# Patient Record
Sex: FEMALE | Race: WHITE | NOT HISPANIC OR LATINO | Employment: UNEMPLOYED | URBAN - NONMETROPOLITAN AREA
[De-identification: names, ages, dates, MRNs, and addresses within clinical notes are randomized per-mention and may not be internally consistent; named-entity substitution may affect disease eponyms.]

---
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
## 2024-01-07 ENCOUNTER — HOSPITAL ENCOUNTER (EMERGENCY)
Facility: OTHER | Age: 19
Discharge: PSYCHIATRIC OR SUBSTANCE USE FACILITY | End: 2024-01-08
Attending: STUDENT IN AN ORGANIZED HEALTH CARE EDUCATION/TRAINING PROGRAM | Admitting: STUDENT IN AN ORGANIZED HEALTH CARE EDUCATION/TRAINING PROGRAM
Payer: MEDICAID

## 2024-01-07 DIAGNOSIS — Z72.89 DELIBERATE SELF-CUTTING: ICD-10-CM

## 2024-01-07 DIAGNOSIS — R45.851 SUICIDAL IDEATION: ICD-10-CM

## 2024-01-07 PROCEDURE — 250N000013 HC RX MED GY IP 250 OP 250 PS 637: Performed by: STUDENT IN AN ORGANIZED HEALTH CARE EDUCATION/TRAINING PROGRAM

## 2024-01-07 PROCEDURE — 99285 EMERGENCY DEPT VISIT HI MDM: CPT | Performed by: STUDENT IN AN ORGANIZED HEALTH CARE EDUCATION/TRAINING PROGRAM

## 2024-01-07 PROCEDURE — 99291 CRITICAL CARE FIRST HOUR: CPT | Mod: 25 | Performed by: STUDENT IN AN ORGANIZED HEALTH CARE EDUCATION/TRAINING PROGRAM

## 2024-01-07 PROCEDURE — 99292 CRITICAL CARE ADDL 30 MIN: CPT | Performed by: STUDENT IN AN ORGANIZED HEALTH CARE EDUCATION/TRAINING PROGRAM

## 2024-01-07 RX ADMIN — NICOTINE POLACRILEX 2 MG: 2 GUM, CHEWING BUCCAL at 16:24

## 2024-01-07 ASSESSMENT — ACTIVITIES OF DAILY LIVING (ADL)
ADLS_ACUITY_SCORE: 35

## 2024-01-07 NOTE — ED NOTES
[facility] called to state that they were bringing this pt in for depression and suicidal ideation, pt is coming from the [facility]
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
## 2024-01-07 NOTE — ED NOTES
PT mentioned they are feeling really agitated and needs a smoke. Mentioned to RN. RN is administrating Nicotine gum to help with agitation.

## 2024-01-07 NOTE — ED NOTES
PT is in a really good mood. Had a really assuring phone call from her boyfriend and helped her mood tremendously.

## 2024-01-07 NOTE — ED NOTES
RN talked to me and just wanted me to keep an eye on PT. Apparently they don't need a 1 on 1 sitter yet. Only necessary if their RN decides it's necessary

## 2024-01-07 NOTE — ED NOTES
Nurse removed bands from R wrist to the L. PT is calm and cooperative and coloring to keep calm and listening to music on phone

## 2024-01-08 ENCOUNTER — TELEPHONE (OUTPATIENT)
Dept: BEHAVIORAL HEALTH | Facility: CLINIC | Age: 19
End: 2024-01-08
Payer: MEDICAID

## 2024-01-08 ENCOUNTER — HOSPITAL ENCOUNTER (INPATIENT)
Facility: HOSPITAL | Age: 19
LOS: 4 days | Discharge: NURSING, REHAB OR LONG-TERM CARE | End: 2024-01-12
Attending: STUDENT IN AN ORGANIZED HEALTH CARE EDUCATION/TRAINING PROGRAM | Admitting: STUDENT IN AN ORGANIZED HEALTH CARE EDUCATION/TRAINING PROGRAM
Payer: MEDICAID

## 2024-01-08 VITALS
DIASTOLIC BLOOD PRESSURE: 78 MMHG | SYSTOLIC BLOOD PRESSURE: 134 MMHG | HEIGHT: 65 IN | OXYGEN SATURATION: 99 % | TEMPERATURE: 97.4 F | HEART RATE: 88 BPM | WEIGHT: 293 LBS | BODY MASS INDEX: 48.82 KG/M2 | RESPIRATION RATE: 16 BRPM

## 2024-01-08 DIAGNOSIS — F33.1 MAJOR DEPRESSIVE DISORDER, RECURRENT EPISODE, MODERATE (H): Primary | ICD-10-CM

## 2024-01-08 PROBLEM — R45.89 SUICIDAL BEHAVIOR: Status: ACTIVE | Noted: 2024-01-08

## 2024-01-08 LAB
AMPHETAMINES UR QL SCN: ABNORMAL
BARBITURATES UR QL SCN: ABNORMAL
BENZODIAZ UR QL SCN: ABNORMAL
BZE UR QL SCN: ABNORMAL
CANNABINOIDS UR QL SCN: ABNORMAL
FENTANYL UR QL: ABNORMAL
HCG UR QL: NEGATIVE
OPIATES UR QL SCN: ABNORMAL
PCP QUAL URINE (ROCHE): ABNORMAL

## 2024-01-08 PROCEDURE — 250N000013 HC RX MED GY IP 250 OP 250 PS 637: Performed by: STUDENT IN AN ORGANIZED HEALTH CARE EDUCATION/TRAINING PROGRAM

## 2024-01-08 PROCEDURE — 250N000013 HC RX MED GY IP 250 OP 250 PS 637: Performed by: NURSE PRACTITIONER

## 2024-01-08 PROCEDURE — 124N000001 HC R&B MH

## 2024-01-08 PROCEDURE — 81025 URINE PREGNANCY TEST: CPT | Performed by: STUDENT IN AN ORGANIZED HEALTH CARE EDUCATION/TRAINING PROGRAM

## 2024-01-08 PROCEDURE — 80307 DRUG TEST PRSMV CHEM ANLYZR: CPT | Performed by: STUDENT IN AN ORGANIZED HEALTH CARE EDUCATION/TRAINING PROGRAM

## 2024-01-08 RX ORDER — NICOTINE 21 MG/24HR
1 PATCH, TRANSDERMAL 24 HOURS TRANSDERMAL DAILY
Status: DISCONTINUED | OUTPATIENT
Start: 2024-01-08 | End: 2024-01-12 | Stop reason: HOSPADM

## 2024-01-08 RX ORDER — LANOLIN ALCOHOL/MO/W.PET/CERES
3 CREAM (GRAM) TOPICAL
Status: DISCONTINUED | OUTPATIENT
Start: 2024-01-08 | End: 2024-01-12 | Stop reason: HOSPADM

## 2024-01-08 RX ORDER — HYDROXYZINE HYDROCHLORIDE 25 MG/1
25 TABLET, FILM COATED ORAL EVERY 4 HOURS PRN
Status: DISCONTINUED | OUTPATIENT
Start: 2024-01-08 | End: 2024-01-08 | Stop reason: HOSPADM

## 2024-01-08 RX ORDER — OLANZAPINE 10 MG/1
10 TABLET ORAL 3 TIMES DAILY PRN
Status: DISCONTINUED | OUTPATIENT
Start: 2024-01-08 | End: 2024-01-09

## 2024-01-08 RX ORDER — ACETAMINOPHEN 325 MG/1
650 TABLET ORAL EVERY 4 HOURS PRN
Status: DISCONTINUED | OUTPATIENT
Start: 2024-01-08 | End: 2024-01-12 | Stop reason: HOSPADM

## 2024-01-08 RX ORDER — DIPHENHYDRAMINE HCL 25 MG
50 CAPSULE ORAL ONCE
Status: COMPLETED | OUTPATIENT
Start: 2024-01-08 | End: 2024-01-08

## 2024-01-08 RX ORDER — ACETAMINOPHEN 325 MG/1
650 TABLET ORAL EVERY 4 HOURS PRN
Status: DISCONTINUED | OUTPATIENT
Start: 2024-01-08 | End: 2024-01-08 | Stop reason: HOSPADM

## 2024-01-08 RX ORDER — AMOXICILLIN 250 MG
1 CAPSULE ORAL 2 TIMES DAILY PRN
Status: DISCONTINUED | OUTPATIENT
Start: 2024-01-08 | End: 2024-01-12 | Stop reason: HOSPADM

## 2024-01-08 RX ORDER — MAGNESIUM HYDROXIDE/ALUMINUM HYDROXICE/SIMETHICONE 120; 1200; 1200 MG/30ML; MG/30ML; MG/30ML
30 SUSPENSION ORAL EVERY 4 HOURS PRN
Status: DISCONTINUED | OUTPATIENT
Start: 2024-01-08 | End: 2024-01-12 | Stop reason: HOSPADM

## 2024-01-08 RX ORDER — OLANZAPINE 10 MG/2ML
10 INJECTION, POWDER, FOR SOLUTION INTRAMUSCULAR 3 TIMES DAILY PRN
Status: DISCONTINUED | OUTPATIENT
Start: 2024-01-08 | End: 2024-01-09

## 2024-01-08 RX ORDER — HYDROXYZINE HYDROCHLORIDE 25 MG/1
25 TABLET, FILM COATED ORAL EVERY 4 HOURS PRN
Status: DISCONTINUED | OUTPATIENT
Start: 2024-01-08 | End: 2024-01-12 | Stop reason: HOSPADM

## 2024-01-08 RX ADMIN — DIPHENHYDRAMINE HYDROCHLORIDE 50 MG: 25 CAPSULE ORAL at 02:39

## 2024-01-08 RX ADMIN — NICOTINE 1 PATCH: 21 PATCH, EXTENDED RELEASE TRANSDERMAL at 19:44

## 2024-01-08 ASSESSMENT — ACTIVITIES OF DAILY LIVING (ADL)
ADLS_ACUITY_SCORE: 35
ADLS_ACUITY_SCORE: 28
ADLS_ACUITY_SCORE: 45
ADLS_ACUITY_SCORE: 35
ADLS_ACUITY_SCORE: 45
ADLS_ACUITY_SCORE: 35
ADLS_ACUITY_SCORE: 35
ADLS_ACUITY_SCORE: 28
ADLS_ACUITY_SCORE: 35
ADLS_ACUITY_SCORE: 35
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
## 2024-01-08 NOTE — TELEPHONE ENCOUNTER
R:  HI/Mike Pike with [facility] is reviewing pt for [facility] @ 1204 PM.   1:32 PM Shhazad at [facility] called and accepted pt for [facility] under MD [facility]. Bed is available, call anytime for report.     Updated worklist and added to admit board.  Did tranfers center, bed planning, and placed pt in queue.     2:02 PM [facility] called with placement info.  Indicia complete

## 2024-01-08 NOTE — ED NOTES
No safety plan in notes or AVS. Contacted [facility], spoke with Po.  who saw patient at 1825 today will contact ER.

## 2024-01-08 NOTE — PLAN OF CARE
Mansoor Skinner  January 8, 2024  Plan of Care Hand-off Note     Patient Care Path: inpatient mental health    Plan for Care:   It is the recommendation of this clinician that pt admit to IP MH for safety and stabilization. Pt displays the following risk factors that support IP admission: Pt presents for suicide ideation with no specific plan. Pt reports she doesn't have housing, no insurance, and lacks supports in the state. Pt reports she is in need of help and has no skills to support herself. Pt reports she was kicked out of the homeless shelter she was staying due to drinking on property. Pt father reports pt has been having increasing mental health issues and needs help navigating the system. Pt is unable to engage in safety planning to mitigate risk level in a non-secure setting. Lower levels of care are not sufficient. Due to this IP is the least restrictive option of care for pt. Pt should remain in IP until deemed safe to return to the community and engage in OP  supports. Pt would benefit from social work consult and CIERRA assessment coordination.    Identified Goals and Safety Issues: Pt has no insurance, no local supports, no housing, and lacks skills to support herself. Reports ideas to kill herself but does not have plan. Unable to safety plan.    Overview:  Amandeep Skinner (Father) -- -- [phone]            Legal Status: Legal Status at Admission: Voluntary/Patient has signed consent for treatment    Psychiatry Consult: no       Updated   regarding plan of care.           Negar Arce, [facility], LADC

## 2024-01-08 NOTE — PROGRESS NOTES
"Triage & Transition Services, Extended Care     Therapy Progress Note    Patient: Mansoor goes by \"Mansoor,\" uses she/her pronouns  Date of Service: January 8, 2024  Site of Service: [facility]                               Patient was seen yes  Mode of Assessment: Virtual: AmWell    Presentation Summary: Writer and patient met virtually.  Patient is somewhat engaged in the session however does not appear to be an entirely accurate historian.  Patient reports that she recently moved to [place] from [place] with her father in late November or early December 2023.  She states since moving they were kicked out of the friend's home who they were originally staying with, and have also both been kicked out of [facility] due to alcohol use.  Patient reports that she has limited supports or coping skills that she can engage in.  Patient reports that she has been having increased depression since she was 15 since she has been dealing with homelessness.  Patient states that she has recently began cutting again stating this is the only thing that she can do to help distract herself from suicidal thoughts.  Additionally she endorses her sister's 7th birthday January 5, as an increasingly stressful day she reports that she helped raise both her younger brother and sister, but states her mother got off her contact with them stating her mother told her \"will never see them again\".  Patient did become tearful when talking about this.  Patient denies any supports in [place], but does state she was working with therapy in [place].  At this time patient struggles with goal oriented thinking, and appears to be stuck in negative thought process.  Throughout the session patient gives writer 3 different accounts of how much alcohol she has been drinking recently, 1 count stating \"about a glass at a time\" another account she states she drinks \"3 cups of vodka\" with her final count stating \"till I blackout\".  " Patient does not state how often she drinks. She does endorse an increase in SIB over the past few weeks.    Therapeutic Intervention(s) Provided: Engaged in cognitive restructuring/ reframing, looked at common cognitive distortions and challenged negative thoughts.    Current Symptoms: anxious sense of doom, apathy, negativistic, hoplessness, helplessness, thoughts of death/suicide          Mental Status Exam   Affect: Blunted  Appearance: Appropriate  Attention Span/Concentration: Attentive  Eye Contact: Engaged    Fund of Knowledge: Appropriate   Language /Speech Content: Fluent  Language /Speech Volume: Normal  Language /Speech Rate/Productions: Normal  Recent Memory: Intact  Remote Memory: Intact  Mood: Sad, Depressed  Orientation to Person: Yes   Orientation to Place: Yes  Orientation to Time of Day: Yes  Orientation to Date: Yes     Situation (Do they understand why they are here?): Yes  Psychomotor Behavior: Normal  Thought Content: Suicidal  Thought Form: Intact    Treatment Objective(s) Addressed: rapport building, orienting the patient to therapy, identifying and practicing coping strategies, assessing safety, processing feelings    Patient Response to Interventions: acceptance expressed, needs reinforcement    Progress Towards Goals: Patient Reports Symptoms Are: ongoing  Patient Progress Toward Goals: other (While patient is somewhat engaged it is difficult to discern what information is accurate as patient gives several accounts of recent events.)  Next Step to Work Toward Discharge: symptom stabilization  Symptom Stabilization Comment: Patient would benefit from medication management and increased coping skills to help deal with external stressors.    Case Management:      Plan: inpatient mental health  yes provider Dr. Wade  yes    Clinical Substantiation: Writer continues to recommend inpatient mental health placement due to ongoing suicidal ideation and lack of supports and resources.   Additionally patient has been using increased substances to help cope with her depression and suicidal thoughts. She has engaged in increased SIB over the past few weeks.    Legal Status: Legal Status at Admission: Voluntary/Patient has signed consent for treatment    Session Status: Time session started: 1336  Time session ended: 1356  Session Duration (minutes): 20 minutes  Session Number: 1  Anticipated number of sessions or this episode of care: 3    Time Spent: 20 minutes    CPT Code: CPT Codes: 80607 - Psychotherapy (with patient) - 30 (16-37*) min    Diagnosis:   Patient Active Problem List   Diagnosis Code    Major depressive disorder, recurrent episode, moderate (H) F33.1       Primary Problem This Admission: Active Hospital Problems    *Major depressive disorder, recurrent episode, moderate (H)        Sami Camargo   Licensed Mental Health Professional (LMHP), [facility]  [phone]

## 2024-01-08 NOTE — ED NOTES
Pt is cooperative this AM, breakfast was ordered, VSS, pt reports minimal thoughts of fleeting suicide in the past, but no active threats while here

## 2024-01-08 NOTE — ED PROVIDER NOTES
[facility]  Emergency Department Sign Out Note      Transfer of care from Dr. Jovel. See separate Emergency Department note.    Assessment and Plan:  The patient was evaluated by the previous provider for placement to another cirsis bed vs admit to IP.     ED Course as of 01/08/24 6599   Sun Jan 07, 2024   1933 Not able to contract for safety. DEC recommends either observation overnight with SW consult in morning or IP. Ultimately we decided to pursue both of these options simultaneously placing on IP list and have SW consult tomorrow to explore alternative living options/mental health resources with patient not having insurance.   Mon Jan 08, 2024   1357 DEC Patient has been accepted to [facility]       Diagnosis  1. Suicidal ideation    2. Deliberate self-cutting        Disposition:  Transfer to Madhuri Thornton MD  01/08/24 3217

## 2024-01-08 NOTE — ED PROVIDER NOTES
Transfer of care from previous shift provider:. SI from [facility]. Pending DEC eval. No issues overnight. DEC plan per below. Recommend following up with SW after their consult to see if their are community housing resources available after being released from [place] homeless shelter. Signed out to day shift provider Dr Wade to follow up with SW consult.    ED Course as of 01/08/24 0626   Sun Jan 07, 2024   1933 Not able to contract for safety. DEC recommends either observation overnight with SW consult in morning or IP. Ultimately we decided to pursue both of these options simultaneously placing on IP list and have SW consult tomorrow to explore alternative living options/mental health resources with patient not having insurance.     Assessment and Plan:  Final diagnoses:   Suicidal ideation   Deliberate self-cutting         Bradley Jovel MD  01/08/24 0639

## 2024-01-08 NOTE — PROGRESS NOTES
:    Patient presented to the ED with CRT for depression and suicidal ideations.  Patient has been staying at the [facility] (homeless shelter in [place]).  DEC assessed and recommended inpatient mental health placement.  Patient is voluntary and was placed on the inpatient waiting list.    Met with patient in room and she stated about a month ago her and her father drove to [place] from [place] to stay with a friend.  It did not work out at the friends house and were told them to leave, so they both went to the [facility].  Patient stated her father left a weeks ago back to [place], but she decided to stay as she has a boyfriend here.  Patient requested to have her boyfriend be added onto her contacts. Patient stated her boyfriend is staying at a residential  treatment facility.    Gave her information on housing resources and resources in the community as patient stated she would like to remain in the area after her inpatient stay.    RAMY Jackson on 1/8/2024 at 9:24 AM         :    Patient was accepted to [facility] for inpatient mental health.  No further needs at this time.    RAMY Jackson on 1/8/2024 at 2:03 PM

## 2024-01-08 NOTE — ED NOTES
IP MH Referral Acuity Rating Score (RARS)    LMHP complete at referral to IP MH, with DEC; and, daily while awaiting IP MH placement. Call score to PPS.  CRITERIA SCORING   New 71 HH and Involuntary for IP MH (not adolescent) 0/1   Boarding over 24 hours 0/1   Vulnerable adult at least 55+ with multiple co morbidities; or, Patient age 11 or under 0/1   Suicide ideation without relief of precipitating factors 1/1   Current plan for suicide 0/1   Current plan for homicide 0/1   Imminent risk or actual attempt to seriously harm another without relief of factors precipitating the attempt 1/1   Severe dysfunction in daily living (ex: complete neglect for self care, extreme disruption in vegetative function, extreme deterioration in social interactions) 1/1   Recent (last 2 weeks) or current physical aggression in the ED 0/1   Restraints or seclusion episode in ED 0/1   Verbal aggression, agitation, yelling, etc., while in the ED 1/1   Active psychosis with psychomotor agitation or catatonia 0/1   Need for constant or near constant redirection (from leaving, from others, etc).  0/1   Intrusive or disruptive behaviors 1/1   TOTAL Acuity Total Score: 5

## 2024-01-09 LAB
CLUE CELLS: PRESENT
TRICHOMONAS, WET PREP: ABNORMAL
WBC'S/HIGH POWER FIELD, WET PREP: ABNORMAL
YEAST, WET PREP: ABNORMAL

## 2024-01-09 PROCEDURE — 99223 1ST HOSP IP/OBS HIGH 75: CPT | Mod: AI | Performed by: NURSE PRACTITIONER

## 2024-01-09 PROCEDURE — 87210 SMEAR WET MOUNT SALINE/INK: CPT | Performed by: NURSE PRACTITIONER

## 2024-01-09 PROCEDURE — 99222 1ST HOSP IP/OBS MODERATE 55: CPT | Performed by: NURSE PRACTITIONER

## 2024-01-09 PROCEDURE — 250N000013 HC RX MED GY IP 250 OP 250 PS 637: Performed by: NURSE PRACTITIONER

## 2024-01-09 PROCEDURE — 124N000001 HC R&B MH

## 2024-01-09 RX ORDER — ALBUTEROL SULFATE 90 UG/1
2 AEROSOL, METERED RESPIRATORY (INHALATION) EVERY 6 HOURS PRN
Status: DISCONTINUED | OUTPATIENT
Start: 2024-01-09 | End: 2024-01-12 | Stop reason: HOSPADM

## 2024-01-09 RX ORDER — METRONIDAZOLE 500 MG/1
500 TABLET ORAL 2 TIMES DAILY
Status: DISCONTINUED | OUTPATIENT
Start: 2024-01-09 | End: 2024-01-12 | Stop reason: HOSPADM

## 2024-01-09 RX ORDER — IBUPROFEN 200 MG
800 TABLET ORAL DAILY PRN
COMMUNITY

## 2024-01-09 RX ORDER — ACETAMINOPHEN 325 MG/1
1300 TABLET ORAL DAILY PRN
Status: ON HOLD | COMMUNITY
End: 2024-01-12

## 2024-01-09 RX ORDER — CHLORPROMAZINE HYDROCHLORIDE 25 MG/1
50 TABLET, FILM COATED ORAL 2 TIMES DAILY
Status: DISCONTINUED | OUTPATIENT
Start: 2024-01-09 | End: 2024-01-12 | Stop reason: HOSPADM

## 2024-01-09 RX ADMIN — METRONIDAZOLE 500 MG: 500 TABLET ORAL at 20:20

## 2024-01-09 RX ADMIN — CHLORPROMAZINE HYDROCHLORIDE 50 MG: 25 TABLET, FILM COATED ORAL at 18:57

## 2024-01-09 RX ADMIN — NICOTINE POLACRILEX 4 MG: 4 GUM, CHEWING BUCCAL at 21:57

## 2024-01-09 RX ADMIN — CHLORPROMAZINE HYDROCHLORIDE 50 MG: 25 TABLET, FILM COATED ORAL at 12:12

## 2024-01-09 RX ADMIN — ACETAMINOPHEN 650 MG: 325 TABLET, FILM COATED ORAL at 18:57

## 2024-01-09 RX ADMIN — NICOTINE POLACRILEX 4 MG: 4 GUM, CHEWING BUCCAL at 13:07

## 2024-01-09 RX ADMIN — NICOTINE 1 PATCH: 21 PATCH, EXTENDED RELEASE TRANSDERMAL at 10:11

## 2024-01-09 ASSESSMENT — ACTIVITIES OF DAILY LIVING (ADL)
HYGIENE/GROOMING: INDEPENDENT
ADLS_ACUITY_SCORE: 28
ORAL_HYGIENE: INDEPENDENT
ADLS_ACUITY_SCORE: 28
ADLS_ACUITY_SCORE: 28
DRESS: SCRUBS (BEHAVIORAL HEALTH)
DRESS: SCRUBS (BEHAVIORAL HEALTH);INDEPENDENT
ADLS_ACUITY_SCORE: 28
HYGIENE/GROOMING: INDEPENDENT
LAUNDRY: UNABLE TO COMPLETE
ADLS_ACUITY_SCORE: 28
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
## 2024-01-09 NOTE — PLAN OF CARE
"ADMISSION NOTE    Reason for admission: SI, kicked out of [facility] for drinking, moved to [place] from [place],  Safety concerns: irritable, unknown back ground poor historian, answers questions with questions, confrontational, BMI of 58, defense mode     Risk for or history of violence: I'm from \"fuck shit up club\" \"I'll fight if I have to.\" \"I punch shit\"  pressured speech, boisterous tone et over annunciating words.     Full skin assessment: Head et neck appear atraumatic, superficial cuts to left anterior forearm, (scratch like); barely visible, no s/s infection. Refuses focused assessment to pannus, groin region, et buttocks, absent edema, h/o surgical scar to left elbow, excessive hair to chest et abd ; presents with s/s of hirsutism- hygiene is fair, nails trimmed.     Patient arrived on unit from [facility] ED accompanied by transport et 2 [place] security officers on 1/8/2024  16:40 PM.   Status on arrival: Confrontational, irritable, dismissive, tense, animated,     /60   Temp 98.4  F (36.9  C) (Tympanic)   Resp 18   LMP 12/03/2023 (Approximate)   Patient unable to complete full tour of unit r/t milieu stimuli. Welcome to  unit papers given to patient, wanding completed, belongings inventoried, and admission assessment in progress.   Patient's legal status on arrival is Voluntary.. Appropriate legal rights discussed with and copy given to patient. Patient Bill of Rights discussed with and copy given to patient.   Patient denies SI, HI, and thoughts of self harm and contracts for safety while on unit.      Argenis Parisi, MONICA  1/8/2024  10:59 PM    Pharmacy  [facility] [place], [place]     Pt unable to recall Rx medications prescribed, states, \"I grew up in mental intstiutations all my life, I graduated in one\"     Reason for hospitalization: \"Those people @ the [facility] pissed me off\" \"I'm suicidal\" \"I cut myself\" \"See???\"     21:30: Pt lying in bed @ this time, displaying s/s of " sleep. Pt willing entered -IC) during limited tour of unit, became fearful, paranoid, easily frightened, jumps up, karate stance with fists out. Pt became tearful once settled in MH-ICU. No prn pharmacological intervention implemented this PM shift.     Face to face end of shift report communicated to on-coming [facility] staff.     Argenis Parisi RN  1/9/2024  12:35 AM    Problem: Adult Behavioral Health Plan of Care  Goal: Patient-Specific Goal (Individualization)  Description: Pt. Will follow recommendations of treatment team during hospital stay.   Pt. Will maintain ADLs without prompting during hospital stay.   Pt. Will attend > 50% of unit programing during hospital stay.   Pt. Will sleep 6-8 hours a night during hospital stay.  Pt. Will be free from self harm during hospital stay.  Pt. Will verbalize 3 coping skills prior to discharge.     Problem: Suicide Risk  Goal: Absence of Self-Harm  Outcome: Progressing   Goal Outcome Evaluation:
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
## 2024-01-09 NOTE — PLAN OF CARE
Problem: Adult Behavioral Health Plan of Care  Goal: Patient-Specific Goal (Individualization)  Description: Pt. Will follow recommendations of treatment team during hospital stay.   Pt. Will maintain ADLs without prompting during hospital stay.   Pt. Will attend > 50% of unit programing during hospital stay.   Pt. Will sleep 6-8 hours a night during hospital stay.  Pt. Will be free from self harm during hospital stay.  Pt. Will verbalize 3 coping skills prior to discharge.   1/08/23; MHICU upon admit r/t verbal aggression, unpredictability et decreased stimuli. No wean @ this time. Treatment team to discuss daily.    Outcome: Progressing     Problem: Psychotic Symptoms  Goal: Psychotic Symptoms  Description: Signs and symptoms of listed problems will be absent or manageable.  Outcome: Progressing     Problem: Suicide Risk  Goal: Absence of Self-Harm  Outcome: Progressing     Face to face shift report received from Ar[facility]. Rounding completed, patient laying in bed in MHICU awake.    Patient appeared to be sleeping for approximately 5 hours since 0015.    Patient had no reported or observed suicidal behavior or self harm this shift.     Face to face report will be communicated to oncoming RN.    Noelle Dodson RN  1/9/2024  6:32 AM

## 2024-01-09 NOTE — H&P
"[facility] PSYCHIATRY   HISTORY AND PHYSICAL     ADMISSION DATA     Mansoor Skinner MRN# [id number]   Age: 18 year old YOB: 2005     Date of Admission: 1/8/2024  Primary Physician: No Ref-Primary, Physician          CHIEF COMPLAINT   \" I really need to stop drinking.\"       HISTORY OF PRESENT ILLNESS     She was brought to the emergency room by crisis response team for suicidal ideation.  She had been staying at the [facility] homeless shelter.    She recently moved to [place] from [place] with her father early December 2023.  She and her father were kicked out of their friends home that they were staying with and they have also both been kicked out of the [facility] due to alcohol use.  She told the emergency room she has been dealing with homelessness since the age of 15 which clearly causes an increase in her depression.  It sounds as though her father return to [place] 1 week ago though she decided to stay here with her boyfriend.  She does not have an active suicidal plan.  Denies any homicidal ideations denies hallucinations.  In the past she had been on Zoloft though she has not been on it for over 1 year she has also been on Thorazine in the past though unclear who prescribed this to her and for what.    I was told that when she arrived to the nursing unit and upon her arrival was quite dramatic and making comments to staff \"I am going to fuck shit up, I fight if I have to.I punch shit.  She was quite irritable upon admission though she is not on a 72-hour hold and is voluntarily here.    I met with her the following morning and upon my meeting with her she had already been woke up twice and made a comment about how \"people keep waking me up\".  She had already told nursing staff she wanted to leave and I told her that I was the person who she would need to speak with if she wanted to leave.  She then started stating \"I need to be getting into housing.  I been homeless for 3 years " "and I cannot do it anymore it makes him more depressed and I need to get back on my Thorazine\".  She is quite animated dramatic and loud.  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  She is quite concrete in her thought process.  Her speech is loud and a bit pressured though it is difficult to tell if this is secondary to her mood versus personality.  She tells me she is having suicidal thoughts and has been all of the time though states \"they only get really bad when I start drinking and I need to stop drinking\".  I asked her if she would like to go to chemical dependency treatment and initially she stated yes the later on told the  she wanted to go to treatment.  She told me that she needed to find an apartment or house of her own from here.  I told her it would be very difficult to find her a place of living from here quickly.  She tells me she has only been here 1 month though previous notes states she has been here 1 year.  She tells me her father went back to [place].  She states she and her father are very close and she misses him though it stayed back in [place] because she met a boy she has been dating for the last 3 weeks.  This man is in some form of residential treatment in [place].  She states \"I need to stick around because he said he is going to be spending $30,000 on me\".  It sounds as though he may be getting a lump sum of money and has told her he is going to spend at all on her.  She states he is a level of her life.  They have been dating 3 weeks.     PSYCHIATRIC HISTORY     She has not had any psychiatric services in [place] though did have a therapist in [place].  She recently started cutting again to distract herself from suicidal thoughts.  She grew up primarily in mental health residential treatment centers.  She states she has been hospitalized multiple times   "     SUBSTANCE USE HISTORY     Alcohol use nearly daily.  Unsure if she has been to treatment in the past though states she does not want treatment now though apparently later on changed her mind       SOCIAL HISTORY   Difficult to assess as she was quite dramatic and really fixated on me finding her housing is soon as possible.  She was born and raised in [place].  She has only been here for 1 month and had been homeless the entire time except for a short period while living with the father's friend.  Her father is now back in [place].  She has no other connections in [place] other than this boyfriend of 3 weeks.  I suspect she is on disability though unsure.       FAMILY HISTORY   Father has issues with alcohol.  On nondiagnostic history of family will try to get more information from her tomorrow.     PAST MEDICAL HISTORY   No past medical history on file.    No past surgical history on file.    Penicillin g     MEDICATIONS   Prior to Admission medications    Medication Sig Start Date End Date Taking? Authorizing Provider   albuterol (PROAIR HFA/PROVENTIL HFA/VENTOLIN HFA) 108 (90 Base) MCG/ACT inhaler Inhale 2 puffs into the lungs every 6 hours as needed for shortness of breath, wheezing or cough 12/5/23   Shanell Hoffman, DO        PHYSICAL EXAM/ROS     I have reviewed the physical exam as documented by Madhuri Rowley CNP  and agree with findings and assessment and have no additional findings to add at this time. The review of systems is negative other than noted in the HPI.       LABS   Recent Results (from the past 24 hour(s))   Urine Drug Screen Panel    Collection Time: 01/08/24 12:35 PM   Result Value Ref Range    Amphetamines Urine Screen Negative Screen Negative    Barbituates Urine Screen Negative Screen Negative    Benzodiazepine Urine Screen Negative Screen Negative    Cannabinoids Urine Screen Positive (A) Screen Negative    Cocaine Urine Screen Negative Screen Negative    Fentanyl Qual Urine  "Screen Negative Screen Negative    Opiates Urine Screen Negative Screen Negative    PCP Urine Screen Negative Screen Negative   HCG qualitative urine    Collection Time: 01/08/24 12:36 PM   Result Value Ref Range    hCG Urine Qualitative Negative Negative         MENTAL STATUS EXAM   Vitals: /68   Pulse 77   Temp 97.7  F (36.5  C) (Tympanic)   Resp 14   LMP 12/03/2023 (Approximate)   SpO2 97%       /53 (BP Location: Right arm)   Pulse 73   Temp 98.2  F (36.8  C) (Tympanic)   Resp 16   LMP 12/03/2023 (Approximate)   SpO2 98%   -Appearance/Behavior:somewhat disheveled  -Motor: Normal  -Gait: Intact none  -Abnormal involuntary movements: None  -Mood: labile  -Affect: expansive  -Speech: pressurred  rambling & loud.  Fixated on leaving.  -Thought process/associations: Somewhat demanding.  Questionable grandiosity versus personality disorder entitlement  -Thought content: States she has hallucinations frequently though when asked what the voices say she states \"I cannot remember\"  -Perceptual disturbance no delusions noted  -Suicidal/Homicidal Ideation:  -Judgment: poor  -Insight: poor  *Orientation: difficult to assess. Doesn't answer all questions.   *Memory: difficult to assess due to edie  *Attention: poor  *Language: fluent, no aphasias, able to repeat phrases and name objects if edie was resolved  *Fund of information: likely average though difficult to assess due to edie  *Cognitive functioning estimate: 0 - independent when stable, currently limited abilify to care for self        ASSESSMENT     This is a 18 year old female with a PMH of multiple hospitalizations from childhood sounds as though she was raised in some residential setting throughout her childhood and was on multiple psychiatric medications and states the only medication that has been significantly effective is Thorazine.  She was having an increase in suicidal thoughts and states it was related to alcohol consumption and " "that \"I need to quit drinking\".  She is no longer having suicidal thoughts though states she is having hallucinations as up until yesterday though \"cannot remember\" what they are saying\".       DIAGNOSIS     1.  Unspecified mood disorder  2.  Unspecified cognitive disorder: Rule out intellectual disability versus borderline intellectual functioning  3.  Alcohol use disorder, moderate to severe       PLAN     Location: Unit   Legal Status: None    Safety Assessment:    Behavioral Orders   Procedures    Code 1 - Restrict to Unit    Routine Programming     As clinically indicated    Status 15     Every 15 minutes.      PTA medications held:     -n/a    PTA medications continued/changed:     -n/a    New medications initiated:     -Restart Thorazine 50 mg twice a day and will increase to 100 twice a day if needed.  She has been on this in the past with no side effects    Programming: Patient will be treated in a therapeutic milieu with appropriate individual and group therapies. Education will be provided on diagnoses, medications, and treatments.     Medical diagnoses:  Per medicine    Consult: None needed today    Tests: None    Anticipated LOS: 3 days  Disposition: Possible chemical dependency treatment versus homeless shelter versus crisis housing       ATTESTATION      April Natalee Galvan NP             "

## 2024-01-09 NOTE — DISCHARGE INSTRUCTIONS
Behavioral Discharge Planning and Instructions    Summary: This is a 18 year old female with a PMH of multiple hospitalizations from childhood sounds as though she was raised in some residential setting throughout her childhood and was on multiple psychiatric medications and states the only medication that has been significantly effective is Thorazine     Main Diagnosis:     Unspecified mood disorder  2.  Unspecified cognitive disorder: Rule out intellectual disability versus borderline intellectual functioning  3.  Alcohol use disorder, moderate to severe      Health Care Follow-up:     [facility]  [address],   [place], [place] [address] [phone]  Interview on January 23rd @ 10:30am    [facility]  [address],   [place] [address] [phone]      CD Treatments:     [facility]  [address] [place] [address] [phone]    [facility]  [address],   [place] [address]  [phone]    [facility]  [address],  [place], [place] [address] [phone]    NUMBERS TO CALL IN CRISIS    National Suicide Prevention Lifeline ([facility])  1-472.949.7432    Crisis Text Line ([place])  Text  HOME  to 858881    Mental Health Crisis Line ([place], [place])  812.880.9391    [facility] Mobile Crisis ([place], [place])   [phone]      If you are feeling very unsafe, call 911 or bring yourself to the Emergency Room        Counseling in [place]:  Brian Rodney           [phone]  [facility]    [facility], [facility]      [phone]  [facility]     Meri Leo [facility] (young kids)    [phone]   Iliana Perez [facility] (older kids & adults)  [phone]   Royer Lebron ROSHAN      [phone]  [facility]       [phone]   Breezy Meier MS, LP   Danita Gutierrez MA, LPC   Tonie Padilla MS psychotherapist   Lolis Miranda MS, LPC   Jihan Rueda P, counselor   Betina Meier ROSHAN,  counselor  [place]        [phone]  Lalit Álvarez, counseling  Dr Peggy Burks, psychiatry  Negar Live, counseling  Brent Pagan, counseling  Blayne Mtz, counseling  Jesenia Dodson, psychiatry   [facility]       [phone]   Cierra Andrews MA, LMFT, [facility]   Angelic Ladd MSW, [facility]          [phone]  [facility]      [phone]   Betina Singh MS, [facility]          [phone]        Kamilah Pepper MSW, [facility] , C-MI   Manuela Cagle MSW, [facility]                                                    [facility]      Era Block MS, [facility]                [phone]      Jenifer Urias PsyD     Marisela GreeneyD, owner      Bozena GreeneyD    Reza Aden MPAS, PALeonieC    Sulma Piña PhD   Izabel Yin MSW, [facility]       [phone]   Charline Baeza [facility]   Tony De Los Santos APRN, CNP,     Petty Bennett MSW, [facility] (adolescents)   Jackie Grinnel APRN, CNP (Hib via telehealth; adolescents)   Marilu CHAUHAN, CNP (Hib via telehealth)   Lakhwinder Manzanares MA, LPC, BCIA (Hib via telehealth)   Astrid Garrido [facility] MSW, [facility]   Heather Lopes [facility]   Bruce Aldana DNP, APRN, CNP   Rosie Adam RN, BSN   Ese Bains RN-BC, BSN (Hib via telehealth)  [facility]         [phone]   Jessiac Arboleda, MSN, PMHNP-[facility]           [phone]   Javier Torres MA, LMFT   Bridget Padron MS RN [facility] NP   Betina Rodriguez, [facility]         [phone]  [facility]       [phone]   [facility]   Irina Pierre (to be [facility])   Dwight Grossman (to be [facility])      Counseling in [place]:  [facility]       [phone]   mental health & CD counseling  Lakhwinder Cortés       [phone]  [facility]       [phone]  [facility]        [phone]   Cierra Campbell   LM       [facility]              [phone]   can do weekends and evenings   DeLorr Hansen, MSW, LICSW, LCSW, CCTP    Edilberto Turner MA, LMFT, [facility]  Cutler Drew Michel       evenings, weekends  [phone]      Counseling in [place]:  [facility]         [phone]   Jessica Arboleda, MSN, PMHNP-BC   Sneha Amaya MA, [facility]      [phone]  [facility]       [phone]   Deborah Shirley PMFT  [facility]       Mary Robertson       [phone]  [facility]    [address] [facility] [address]     [facility]      [phone]  [facility]     Brent Rubin MS Ed, [facility]    [phone]    (kids) denotes providers who also see kids     Attend all scheduled appointments with your outpatient providers. Call at least 24 hours in advance if you need to reschedule an appointment to ensure continued access to your outpatient providers.     Major Treatments, Procedures and Findings:  You were provided with: a psychiatric assessment, assessed for medical stability, medication evaluation and/or management, group therapy, family therapy, individual therapy, CD evaluation/assessment, milieu management, and medical interventions    Symptoms to Report: feeling more aggressive, increased confusion, losing more sleep, mood getting worse, or thoughts of suicide    Early warning signs can include: increased depression or anxiety sleep disturbances increased thoughts or behaviors of suicide or self-harm  increased unusual thinking, such as paranoia or hearing voices    Safety and Wellness:  Take all medicines as directed.  Make no changes unless your doctor suggests them.      Follow treatment recommendations.  Refrain from alcohol and non-prescribed drugs.  Ask your support system to help you reduce your access to items that could harm yourself or others. Items could include:  Firearms  Medicines (both prescribed and  "over-the-counter)  Knives and other sharp objects  Ropes and like materials  Car keys  If there is a concern for safety, call 911. If there is a concern for safety, call 911.    Resources:   Crisis Intervention: 720.970.9476 or 375-532-6879 (TTY: 278.774.5737).  Call anytime for help.  National [place] on Mental Illness (www.[place].fernando.org): 664.931.3163 or 168-117-6856.  [place] Association for Children's Mental Health (www.macmh.org): 222.347.6329.  Alcoholics Anonymous (www.alcoholics-anonymous.org): Check your phone book for your local chapter.  Suicide Awareness Voices of Education (SAVE) (www.save.org): 294-703-ISHR (0722)  National Suicide Prevention Line (www.mentalhealthmn.org): 729-447-HTIA (9376)  Mental Health Consumer/Survivor Network of [place] (www.mhcsn.net): 970.647.8090 or 545-556-9814  Mental Health Association of [place] (www.mentalhealth.org): 157.410.5892 or 740-009-3323  Self- Management and Recovery Training., [place]-- Toll free: 696.479.9767  www.[employer]  Text 4 Life: txt \"LIFE\" to 65016 for immediate support and crisis intervention  Crisis text line: Text \"[place]\" to 421628. Free, confidential, 24/7.  Crisis Intervention: 589.256.9598 or 178-579-7042. Call anytime for help.     General Medication Instructions:   See your medication sheet(s) for instructions.   Take all medicines as directed.  Make no changes unless your doctor suggests them.   Go to all your doctor visits.  Be sure to have all your required lab tests. This way, your medicines can be refilled on time.  Do not use any drugs not prescribed by your doctor.  Avoid alcohol.    Advance Directives:   Scanned document on file with [employer]? No scanned doc  Is document scanned? Pt states no documents  Honoring Choices Your Rights Handout: Informed and given  Was more information offered? Pt declined    The Treatment team has appreciated the opportunity to work with you. If you have any questions or concerns about your recent admission, you can " contact the unit which can receive your call 24 hours a day, 7 days a week. They will be able to get in touch with a Provider if needed. The unit number is [phone] .

## 2024-01-09 NOTE — PROGRESS NOTES
01/08/24 1943   Patient Belongings   Did you bring any home meds/supplements to the hospital?  Yes   Disposition of meds  Sent to security/pharmacy per site process   Patient Belongings remains on inpatient care area;sent to security per site process   Patient Belongings Put in Hospital Secure Location (Security or Locker, etc.) purse/wallet;cell phone/electronics;clothing;wallet;shoes   Belongings Search Yes   Clothing Search Yes   Second Staff Crystal   Comment Black socks, purple shirt, green pants, blue/blackball cap with fishhooks, grey shoes, orange jacket, hairbrush, 4 deodorants, blue pen, lighter with case, container with q-tips. 1 rystal light, sanitary pad, pink coil, 3 phone chargers, 1 earbuds, small , 2 pennies, pink jamshid bear, brown hat, grey blanket, purple backpack, coloring book, colored pencils, blue folder with mail, green folder with hpousing paperwork, green hospital socks, smartphone battery, pouch with toothbrush and toothpaste, purple tube, chapstick.     List items sent to safe: [employer] cell phone, Black wallet with dragon, [place] ID, social security card, taxi card.  All other belongings put in assigned cubby in belongings room.       I have reviewed my belongings list on admission and verify that it is correct.     Patient signature_______________________________    Second staff witness (if patient unable to sign) ______________________________       I have received all my belongings at discharge.    Patient signature________________________________    KAVITA BRAUN   1/8/2024  7:51 PM

## 2024-01-09 NOTE — PLAN OF CARE
"  Problem: Adult Behavioral Health Plan of Care  Goal: Patient-Specific Goal (Individualization)  Description: Pt. Will follow recommendations of treatment team during hospital stay.   Pt. Will maintain ADLs without prompting during hospital stay.   Pt. Will attend > 50% of unit programing during hospital stay.   Pt. Will sleep 6-8 hours a night during hospital stay.  Pt. Will be free from self harm during hospital stay.  Pt. Will verbalize 3 coping skills prior to discharge.     1/9/2024-may wean if behavior with staff and peers is appropriate. Must comply with contraband checks upon return to  ICU.   Treatment team to discuss daily.      Outcome: Progressing    A&O, VSS, denies pain.   Sleeps most of shift-wakes at lunch for lunch and medications.  Pt states jamila has not slept at all and she will not sleep unless she can have her boyfriends blanket brought in. This writer explains that the blanket cannot be brought into facility- pt loudly and dramatically (over enunciate) states \"Oh great! Then I will not sleep the entire time I am here.\"   Loud volume with animated affect. States she was has a long mental health hx-  \"I graduated in a  facility.\" Pt states she is homeless and can't get a job because she has depressed. Pt then verbalizes that states she is going to adopt her siblings when they turn 18 yrs old ,then she is going to have three of her own children with her BF. Pt states she wants to stay in [facility] so she can see her BF.   Weans to open unit to use telephones for short period then returns to  ICU.   Denies all criteria including: SI, SIB, HI or hallucinations.       Problem: Psychotic Symptoms  Goal: Psychotic Symptoms  Description: Signs and symptoms of listed problems will be absent or manageable.  Outcome: Progressing     Problem: Psychotic Symptoms  Goal: Social and Therapeutic (Psychotic Symptoms)  Description: Signs and symptoms of listed problems will be absent or " manageable.  Outcome: Progressing   Goal Outcome Evaluation:    Plan of Care Reviewed With: patient        Face to face end of shift report communicated to oncoming shift.     Noelle Schmid RN  1/9/2024  1:47 PM

## 2024-01-10 PROCEDURE — 124N000001 HC R&B MH

## 2024-01-10 PROCEDURE — H0001 ALCOHOL AND/OR DRUG ASSESS: HCPCS

## 2024-01-10 PROCEDURE — 99232 SBSQ HOSP IP/OBS MODERATE 35: CPT | Performed by: NURSE PRACTITIONER

## 2024-01-10 PROCEDURE — 250N000013 HC RX MED GY IP 250 OP 250 PS 637: Performed by: NURSE PRACTITIONER

## 2024-01-10 RX ORDER — CHLORPROMAZINE HYDROCHLORIDE 25 MG/1
50 TABLET, FILM COATED ORAL 3 TIMES DAILY PRN
Status: DISCONTINUED | OUTPATIENT
Start: 2024-01-10 | End: 2024-01-10

## 2024-01-10 RX ORDER — CHLORPROMAZINE HYDROCHLORIDE 25 MG/1
50 TABLET, FILM COATED ORAL 3 TIMES DAILY PRN
Status: DISCONTINUED | OUTPATIENT
Start: 2024-01-10 | End: 2024-01-12 | Stop reason: HOSPADM

## 2024-01-10 RX ADMIN — NICOTINE POLACRILEX 4 MG: 4 GUM, CHEWING BUCCAL at 12:56

## 2024-01-10 RX ADMIN — CHLORPROMAZINE HYDROCHLORIDE 50 MG: 25 TABLET, FILM COATED ORAL at 08:17

## 2024-01-10 RX ADMIN — METRONIDAZOLE 500 MG: 500 TABLET ORAL at 08:17

## 2024-01-10 RX ADMIN — CHLORPROMAZINE HYDROCHLORIDE 50 MG: 25 TABLET, FILM COATED ORAL at 13:47

## 2024-01-10 RX ADMIN — HYDROXYZINE HYDROCHLORIDE 25 MG: 25 TABLET, FILM COATED ORAL at 10:23

## 2024-01-10 RX ADMIN — NICOTINE POLACRILEX 4 MG: 4 GUM, CHEWING BUCCAL at 10:24

## 2024-01-10 RX ADMIN — NICOTINE 1 PATCH: 21 PATCH, EXTENDED RELEASE TRANSDERMAL at 08:17

## 2024-01-10 RX ADMIN — NICOTINE POLACRILEX 4 MG: 4 GUM, CHEWING BUCCAL at 22:26

## 2024-01-10 RX ADMIN — CHLORPROMAZINE HYDROCHLORIDE 50 MG: 25 TABLET, FILM COATED ORAL at 20:31

## 2024-01-10 RX ADMIN — ACETAMINOPHEN 650 MG: 325 TABLET, FILM COATED ORAL at 13:01

## 2024-01-10 RX ADMIN — NICOTINE POLACRILEX 4 MG: 4 GUM, CHEWING BUCCAL at 18:18

## 2024-01-10 RX ADMIN — NICOTINE POLACRILEX 4 MG: 4 GUM, CHEWING BUCCAL at 14:58

## 2024-01-10 RX ADMIN — METRONIDAZOLE 500 MG: 500 TABLET ORAL at 20:31

## 2024-01-10 ASSESSMENT — ACTIVITIES OF DAILY LIVING (ADL)
ORAL_HYGIENE: INDEPENDENT
ADLS_ACUITY_SCORE: 28
ORAL_HYGIENE: INDEPENDENT
ADLS_ACUITY_SCORE: 28
LAUNDRY: UNABLE TO COMPLETE
ADLS_ACUITY_SCORE: 28
HYGIENE/GROOMING: INDEPENDENT
HYGIENE/GROOMING: INDEPENDENT
DRESS: SCRUBS (BEHAVIORAL HEALTH)
DRESS: SCRUBS (BEHAVIORAL HEALTH);INDEPENDENT
ADLS_ACUITY_SCORE: 28

## 2024-01-10 NOTE — PLAN OF CARE
Problem: Adult Behavioral Health Plan of Care  Goal: Patient-Specific Goal (Individualization)  Description: Pt. Will follow recommendations of treatment team during hospital stay.   Pt. Will maintain ADLs without prompting during hospital stay.   Pt. Will attend > 50% of unit programing during hospital stay.   Pt. Will sleep 6-8 hours a night during hospital stay.  Pt. Will be free from self harm during hospital stay.  Pt. Will verbalize 3 coping skills prior to discharge.     1/9/2024-may wean if behavior with staff and peers is appropriate. Must comply with contraband checks upon return to  ICU.   Treatment team to discuss daily.    Outcome: Progressing     Problem: Psychotic Symptoms  Goal: Psychotic Symptoms  Description: Signs and symptoms of listed problems will be absent or manageable.  Outcome: Progressing     Problem: Suicide Risk  Goal: Absence of Self-Harm  Outcome: Progressing     Face to face shift report received from Betsy Luu completed, patient laying in bed in ICU, appeared to be trying to sleep, non-labored even respirations noted.    Patient appeared to be sleeping for approximately 5.75 hours since 0015.    Patient had no reported or observed suicidal behavior or self harm this shift.     Face to face report will be communicated to oncoming RN.    Noelle Dodson RN  1/10/2024  6:17 AM

## 2024-01-10 NOTE — PROGRESS NOTES
Type Of Assessment: Inpatient Substance Use Comprehensive Assessment    Referral Source:  [facility]  Unit Phone: [phone]  MRN: [id number]    DATE OF SERVICE: January 10, 2024  Date of previous CIERRA Assessment: N/A  Patient confirmed identity through two factor verification: Full Legal Name and     PATIENT'S NAME: Mansoor Skinner  PREFERRED NAME: Mansoor  PRONOUNS: they/them  Age: 18 year old  Last 4 SSN: [id number]  Sex: female   Gender Identity: gender nonbinary  Sexual Orientation: Bisexual  Cultural Background: No, Denies any cultural influences or concerns that need to be considered for treatment  YOB: 2005  Current Address:   [address] [address]  Patient Phone Number:  [phone]   Patient's E-Mail Contact:  No e-mail address on record  Funding:  No Insurance  PMI: N/A  Emergency Contact: Cierra Pachecotelma (Friend) P: [phone]     DAANES information was provided to patient and patient does not want a copy.     Telemedicine Visit: The patient's condition can be safely assessed and treated via synchronous audio and visual telemedicine encounter.    Reason for Telemedicine Visit: Services only offered telehealth  Originating Site (Patient Location): [address] ([address]) [place] [id number]   Distant Site (Provider Location): Provider Remote Setting- Home Office  Consent:  The patient/guardian has verbally consented to: the potential risks and benefits of telemedicine (video visit) versus in person care; bill my insurance or make self-payment for services provided; and responsibility for payment of non-covered services.   Mode of Communication:  Video Conference via  [employer]    START TIME: 1041  END TIME: 1113    As the provider I attest to compliance with applicable laws and regulations related to telemedicine.   Mansoor Skinner was seen for a substance use disorder consult on 1/10/2024 by Tunde JOLLEY  "SREEDHAR Haddad.    Reason for Substance Use Disorder Consult:  Per H&P 1/9/24:  She was brought to the emergency room by crisis response team for suicidal ideation.  She had been staying at the [facility] homeless shelter.  She recently moved to [place] from [place] with her father early December 2023.  She and her father were kicked out of their friends home that they were staying with and they have also both been kicked out of the [facility] due to alcohol use.  She told the emergency room she has been dealing with homelessness since the age of 15 which clearly causes an increase in her depression.  It sounds as though her father return to [place] 1 week ago though she decided to stay here with her boyfriend.  She does not have an active suicidal plan.  Denies any homicidal ideations denies hallucinations.  In the past she had been on Zoloft though she has not been on it for over 1 year she has also been on Thorazine in the past though unclear who prescribed this to her and for what.  I was told that when she arrived to the nursing unit and upon her arrival was quite dramatic and making comments to staff \"I am going to fuck shit up, I fight if I have to.I punch shit.  She was quite irritable upon admission though she is not on a 72-hour hold and is voluntarily here.  I met with her the following morning and upon my meeting with her she had already been woke up twice and made a comment about how \"people keep waking me up\".  She had already told nursing staff she wanted to leave and I told her that I was the person who she would need to speak with if she wanted to leave.  She then started stating \"I need to be getting into housing.  I been homeless for 3 years and I cannot do it anymore it makes him more depressed and I need to get back on my Thorazine\".  She is quite animated dramatic and loud.  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  She is quite concrete in her thought process.  Her speech is loud and a bit pressured though it is difficult to tell if this is secondary to her mood versus personality.  She tells me she is having suicidal thoughts and has been all of the time though states \"they only get really bad when I start drinking and I need to stop drinking\".  I asked her if she would like to go to chemical dependency treatment and initially she stated yes the later on told the  she wanted to go to treatment.  She told me that she needed to find an apartment or house of her own from here.  I told her it would be very difficult to find her a place of living from here quickly.  She tells me she has only been here 1 month though previous notes states she has been here 1 year.  She tells me her father went back to [place].  She states she and her father are very close and she misses him though it stayed back in [place] because she met a boy she has been dating for the last 3 weeks.  This man is in some form of residential treatment in [place].  She states \"I need to stick around because he said he is going to be spending $30,000 on me\".  It sounds as though he may be getting a lump sum of money and has told her he is going to spend at all on her.  She states he is a level of her life.  They have been dating 3 weeks.    Are you currently having severe withdrawal symptoms that are putting yourself or others in danger? No  Are you currently having severe medical problems that require immediate attention? No  Are you currently having severe emotional or behavioral problems that are putting yourself or others at risk of harm? Yes, explain: Pt is currently admitted to a behavioral health unit    Have you participated in prior substance use disorder evaluations? No   Have you ever been to detox, inpatient or outpatient treatment for substance related use? List previous treatment: No   Have you ever had " "a gambling problem or had treatment for compulsive gambling? No  Have you ever felt the need to bet more and more money? No  Have you ever had to lie to people important to you about how much you gambled? No    Patient does not appear to be in severe withdrawal, an imminent safety risk to self or others, or requiring immediate medical attention and may proceed with the assessment interview.    Comprehensive Substance Use History   X X = Primary Drug Used Age of First Use    Pattern of Substance Use   (heaviest use in life and a use history within the past year if applicable) (DSM-5: Sx #3) Date /  Quantity of last use if within the past 30 days Withdrawal Potential?   Method of use  (Oral, smoked, snorted, IV, etc)   x Alcohol   13 Pt reports \"it's normally when I can get my hands on it, which is not that often\"  Pt unable to quantify amounts, reports \"honestly I drink til I'm drunk, it doesn't take me very long, usually half a cup\" of liquor  Per chart review pt has also reported their alcohol use as \"near daily\" during current admission 1/6/24 No Oral   x Marijuana/Hashish   14 Pt reports \"lately it has not been very much because I don't have it in my possession\" due to friend moving, reports they have been smoking \"a lot\" prior to this, reports they smoke appx 4 days per week \"It's been about two weeks\" No Smoked, Vaped    Cocaine/Crack No use        Meth/Amphetamines   No use        Heroin   No use        Other Opiates/Synthetics   No use        Inhalants  No use        Benzodiazepines   No use        Hallucinogens   No use        Barbiturates/Sedatives/Hypnotics   No use        Over-the-Counter Drugs   No use        Other   No use        Nicotine   12 Pt reports \"I love my nicotine, I cannot go a day without having nicotine\", reports they smoke/vape daily, is using patch and gum for NRT while admitted 1/10/24 Yes Smoked, Vaped     Withdrawal symptoms: Have you had any of the following withdrawal " "symptoms?  Agitation  Fatigue / Extremely Tired  Irritability  Dizziness    Have you experienced any cravings?  Yes, \"weed definitely, alcohol not so much\"    Have you had periods of abstinence?  Yes   What was your longest period? Pt reports they have been sober from alcohol for about 1 year in the past    Any circumstances that lead to relapse? Pt denies    What activities have you engaged in when using alcohol/other drugs that could be hazardous to you or others?  The patient denied engaging in any of the above dangerous activities when using alcohol and/or drugs.    A description of any risk-taking behavior, including behavior that puts the client at risk of exposure to blood-borne or sexually transmitted diseases: Pt denies    Arrests and legal interventions related to substance use: Pt denies    A description of how the patient's use affected their ability to function appropriately in a work setting:  The patient reported their substance use has not negatively impacted their ability to function in a work setting.    A description of how the patient's use affected their ability to function appropriately in an educational setting:    The patient reported their substance use has not negatively impacted their ability to function in a school setting.    Leisure time activities that are associated with substance use: Pt reports they \"love listening to music while [they] smoke\"    Do you think your substance use has become a problem for you? They agrees they has a substance abuse problem.  They reports they feel their alcohol use is problematic but denies feeling their cannabis use is problematic    MEDICAL HISTORY  Physical or medical concerns or diagnoses:   No past medical history on file.     Patient Active Problem List   Diagnosis Code    Major depressive disorder, recurrent episode, moderate (H) F33.1    Suicidal behavior R45.89      Do you have any current medical treatment needs not being addressed by " "inpatient treatment?  Pt denies    Do you need a referral for a medical provider? Pt denies having a PCP in [place] and reports they would be interested in establishing Primary Care    Current medications: Patient reports current meds as:   Outpatient Medications Marked as Taking for the 1/8/24 encounter (Hospital Encounter)   Medication Sig    acetaminophen (TYLENOL) 325 MG tablet Take 1,300 mg by mouth daily as needed (pain/fever)    albuterol (PROAIR HFA/PROVENTIL HFA/VENTOLIN HFA) 108 (90 Base) MCG/ACT inhaler Inhale 2 puffs into the lungs every 6 hours as needed for shortness of breath, wheezing or cough    ibuprofen (ADVIL/MOTRIN) 200 MG tablet Take 800 mg by mouth daily as needed for pain (fever)     Are you pregnant? No    Do you have any specific physical needs/accommodations? No    MENTAL HEALTH HISTORY:  Have you ever had  hospitalizations or treatment for mental health illness: Yes. When, Where, and What circumstances: Pt reports this is their first [facility] admission in [place]  Reports \"a lot\" of previous admissions in [place], and stated \"I've been to 3 different mental facilities in [place]\" and reports these admissions were related to SI, being off medications, and depression  Pt reports a Hx of medication management  Pt reports a Hx of therapy (pt reports \"I've made 3 therapists quit because my mental problems... most therapists just can't handle my mental problems\")  Pt reports a Hx of residential  Tx as an adolescent    Mental health history, including diagnosis and symptoms, and the effect on the client's ability to function: Pt reports Hx Dx of depression, anxiety, bipolar, \"I wanna say schizophrenic but I'm sure if I'm still schizophrenic or not.  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  She recently started cutting again to distract herself from suicidal thoughts.  She grew up primarily in mental health residential treatment centers.  She states she has been hospitalized multiple times     MENTAL STATUS EXAM   Vitals: /68   Pulse 77   Temp 97.7  F (36.5  C) (Tympanic)   Resp 14   LMP 12/03/2023 (Approximate)   SpO2 97%         /53 (BP Location: Right arm)   Pulse 73   Temp 98.2  F (36.8  C) (Tympanic)   Resp 16   LMP 12/03/2023 (Approximate)   SpO2 98%   -Appearance/Behavior:somewhat disheveled  -Motor: Normal  -Gait: Intact none  -Abnormal involuntary movements: None  -Mood: labile  -Affect: expansive  -Speech: pressurred  rambling & loud.  Fixated on leaving.  -Thought process/associations: Somewhat demanding.  Questionable grandiosity versus personality disorder entitlement  -Thought content: States she has hallucinations frequently though when asked what the voices say she states \"I cannot remember\"  -Perceptual disturbance no delusions noted  -Suicidal/Homicidal Ideation:  -Judgment: poor  -Insight: poor  *Orientation: difficult to assess. Doesn't answer all questions.   *Memory: difficult to assess due to edie  *Attention: poor  *Language: fluent, no aphasias, able to repeat phrases and name objects if edie was resolved  *Fund of information: likely average though difficult to assess due to edie  *Cognitive functioning estimate: 0 - independent when stable, currently limited abilify to care for self      DIAGNOSIS      1.  Unspecified mood disorder  2.  Unspecified cognitive disorder: Rule out intellectual disability versus borderline intellectual functioning  3.  Alcohol use disorder, moderate to severe     Current mental health treatment including psychotropic medication needed to maintain stability: (Note: The " "assessment must utilize screening tools approved by the commissioner pursuant to section 245.4863 to identify whether the client screens positive for co-occurring disorders): Pt denies any prior to current admission    GAIN-SS Tool:      1/10/2024    11:00 AM   When was the last time that you had significant problems...   with feeling very trapped, lonely, sad, blue, depressed or hopeless about the future? Past month   with sleep trouble, such as bad dreams, sleeping restlessly, or falling asleep during the day? Past Month   with feeling very anxious, nervous, tense, scared, panicked or like something bad was going to happen? Past month   with becoming very distressed & upset when something reminded you of the past? Past month   with thinking about ending your life or committing suicide? Past month         1/10/2024    11:00 AM   When was the last time that you did the following things 2 or more times?   Lied or conned to get things you wanted or to avoid having to do something? 2 to 12 months ago   Had a hard time paying attention at school, work or home? Past month   Had a hard time listening to instructions at school, work or home? Past month   Were a bully or threatened other people? 1+ years ago   Started physical fights with other people? 1+ years ago     Have you ever been verbally, emotionally, physically or sexually abused?   The patient denied having any history of being verbally, emotionally, physically or sexually abused.    Family history of substance use and misuse: Pt reports their mother struggled with meth, alcohol, and weed    The patient's desire for family involvement in the treatment program: TBD  Level of family support: Pt reports their mother \"disowned [them] as a daughter\" and that their relationship with their dad is pretty good    Social network in relation to expected support for recovery: Pt reports their boyfriend Kaige    Are you currently in a significant relationship? Yes.  4B. How " "long? 3 weeks        Please describe your significant other's use of mood altering chemicals? Pt reports their boyfriend doesn't drink alcohol but sometimes smoke cannabis together \"when he feels like it, yes\"    Do you have any children (include living arrangements/custody/contact)?:  Pt denies    What is your current living situation? Pt reports prior to current admission they were living at [facility], but was kicked out due to drinking alcohol on the property    Are you employed/attending school? Pt denies    SUMMARY:  Ability to understand written treatment materials: Yes  Ability to understand patient rules and patient rights: Yes  Does the patient recognize needs related to substance use and is willing to follow treatment recommendations: Yes  Does the patient have an opioid use disorder:  does not have a history of opiate use.    ASAM Dimension Scale Ratings:  Dimension 1: 0 Client displays full functioning with good ability to tolerate and cope with withdrawal discomfort. No signs or symptoms of intoxication or withdrawal or resolving signs or symptoms.  Dimension 2: 1 Client tolerates and allie with physical discomfort and is able to get the services that the client needs.  Dimension 3: 2 Client has difficulty with impulse control and lacks coping skills. Client has thoughts of suicide or harm to others without means; however, the thoughts may interfere with participation in some treatment activities. Client has difficulty functioning in significant life areas. Client has moderate symptoms of emotional, behavioral, or cognitive problems. Client is able to participate in most treatment activities.  Dimension 4: 2 Client displays verbal compliance, but lacks consistent behaviors; has low motivation for change; and is passively involved in treatment.  Dimension 5: 4 No awareness of the negative impact of mental health problems or substance abuse. No coping skills to arrest mental health or addiction " illnesses, or prevent relapse.  Dimension 6: 4 Client has (A) Chronically antagonistic significant other, living environment, family, peer group or long-term criminal justice involvement that is harmful to recovery or treatment progress, or (B) Client has an actively antagonistic significant other, family, work, or living environment with immediate threat to the client's safety and well-being.    Category of Substance Severity (ICD-10 Code / DSM 5 Code)     Alcohol Use Disorder Severe  (10.20) (303.90)   Cannabis Use Disorder Severe   (F12.20) (304.30)   Hallucinogen Use Disorder The patient does not meet the criteria for a Hallucinogen use disorder.   Inhalant Use Disorder The patient does not meet the criteria for an Inhalant use disorder.   Opioid Use Disorder The patient does not meet the criteria for an Opioid use disorder.   Sedative, Hypnotic, or Anxiolytic Use Disorder The patient does not meet the criteria for a Sedative/Hypnotic use disorder.   Stimulant Related Disorder The patient does not meet the criteria for a Stimulant use disorder.   Tobacco Use Disorder Mild    (Z72.0) (305.1)   Other (or unknown) Substance Use Disorder The patient does not meet the criteria for a Other (or unknown) Substance use disorder.     A problematic pattern of alcohol/drug use leading to clinically significant impairment or distress, as manifested by at least two of the following, occurring within a 12-month period:    1.) Alcohol/drug is often taken in larger amounts or over a longer period than was intended.  4.) Craving, or a strong desire or urge to use alcohol/drug  5.) Recurrent alcohol/drug use resulting in a failure to fulfill major role obligations at work, school or home.  6.) Continued alcohol use despite having persistent or recurrent social or interpersonal problems caused or exacerbated by the effects of alcohol/drug.  7.) Important social, occupational, or recreational activities are given up or reduced  because of alcohol/drug use.  9.) Alcohol/drug use is continued despite knowledge of having a persistent or recurrent physical or psychological problem that is likely to have been caused or exacerbated by alcohol.  10.) Tolerance, as defined by either of the following: A need for markedly increased amounts of alcohol/drug to achieve intoxication or desired effect.  11.) Withdrawal, as manifested by either of the following: The characteristic withdrawal syndrome for alcohol/drug (refer to Criteria A and B of the criteria set for alcohol/drug withdrawal).    Specify if: In early remission:  After full criteria for alcohol/drug use disorder were previously met, none of the criteria for alcohol/drug use disorder have been met for at least 3 months but for less than 12 months (with the exception that Criterion A4,  Craving or a strong desire or urge to use alcohol/drug  may be met).     In sustained remission:   After full criteria for alcohol use disorder were previously met, non of the criteria for alcohol/drug use disorder have been met at any time during a period of 12 months or longer (with the exception that Criterion A4,  Craving or strong desire or urge to use alcohol/drug  may be met).     Specify if:   This additional specifier is used if the individual is in an environment where access to alcohol is restricted.    Mild: Presence of 2-3 symptoms  Moderate: Presence of 4-5 symptoms  Severe: Presence of 6 or more symptoms    Collateral information: CIERRA Collateral Info: Sufficient information is obtained from the patient to support diagnosis and recommendations. Contact with a collateral sources is not required.    Recommendations:  1)  Complete a Residential MICD Treatment Program  2)  Abstain from all mood-altering chemicals unless prescribed by a licensed provider.   3)  Attend, at minimum, 2 weekly support group meetings, such as 12 step based (AA/NA), SMART Recovery, Health Realizations, and/or Refuge  Recovery meetings.     4)  Actively work with a mentor/sponsor on a weekly basis.   5)  Follow all the recommendations of your treatment/medical providers.  6)  Patient may benefit from obtaining a full mental health evaluation.    Clinical Substantiation:  Patient is homeless and lacks a sober living environment, lacks long-term sober maintenance skills, lacks sober coping skills, lacks awareness regarding the disease model of addiction, lacks a sober peer support network, and has dual issues of mental health and substance abuse.    DAANES Assessment ID: [id number]     CIERRA consult completed by:   SARABJIT Petersen, [facility]  Substance Use Disorder Evaluation Counselor  E-mail Address: [email or web address] Consult & Liaison Department  [facility], [address] [place] [address]     *Due to regulation of Title 42 of the Code of Federal Regulations (CFR) Part 2: Confidentiality laws apply to this note and the information wherein.  Thus, this note cannot be copy and pasted into any other health care staff's note nor can it be included in general medical records sent to ANY outside agency without the patient's written consent.

## 2024-01-10 NOTE — PLAN OF CARE
Problem: Adult Behavioral Health Plan of Care  Goal: Patient-Specific Goal (Individualization)  Description: Pt. Will follow recommendations of treatment team during hospital stay.   Pt. Will maintain ADLs without prompting during hospital stay.   Pt. Will attend > 50% of unit programing during hospital stay.   Pt. Will sleep 6-8 hours a night during hospital stay.  Pt. Will be free from self harm during hospital stay.  Pt. Will verbalize 3 coping skills prior to discharge.     1/9/2024-may wean if behavior with staff and peers is appropriate. Must comply with contraband checks upon return to  ICU.   Treatment team to discuss daily.      Outcome: Progressing    Alert, VSS, pain to R -5th digit (little toe) 6/10-tylenol 650 mg rec'd. Pt states she stubbed it on a chair twice yesterday. No bruising, swelling or redness noted.   Full range, animated affect with excitable mood- behavior is appropriate and pt is redirectable.   Atarax 25 mg rec'd for anxiety per request.   Speech volume is loud and rambling. Weans to open unit the majority of shift.   Denies all criteria including: SI, SIB, HI or hallucinations.  Lets needs be known.       Problem: Psychotic Symptoms  Goal: Psychotic Symptoms  Description: Signs and symptoms of listed problems will be absent or manageable.  Outcome: Progressing     Problem: Suicide Risk  Goal: Absence of Self-Harm  Outcome: Progressing   Goal Outcome Evaluation:    Plan of Care Reviewed With: patient        Face to face end of shift report communicated to oncoming shift.     Noelle Schmid RN  1/10/2024  1:25 PM

## 2024-01-10 NOTE — CONSULTS
Met with pt for CD Consult and completed CIERRA Comprehensive Assessment recommending residential MICD LOC.  Pt reports they are hoping to find housing in [facility] and start an IOP Tx while looking for work, but that they are open to residential Tx if they cannot obtain housing in an appropriate timeline.  Unit staff to assist with referrals.    Recommendations:  1)  Complete a Residential MICD Treatment Program  2)  Abstain from all mood-altering chemicals unless prescribed by a licensed provider.   3)  Attend, at minimum, 2 weekly support group meetings, such as 12 step based (AA/NA), SMART Recovery, Health Realizations, and/or Refuge Recovery meetings.     4)  Actively work with a mentor/sponsor on a weekly basis.   5)  Follow all the recommendations of your treatment/medical providers.  6)  Patient may benefit from obtaining a full mental health evaluation.    Clinical Substantiation:  Patient is homeless and lacks a sober living environment, lacks long-term sober maintenance skills, lacks sober coping skills, lacks awareness regarding the disease model of addiction, lacks a sober peer support network, and has dual issues of mental health and substance abuse.    DAANES Assessment ID: [id number]     CIERRA consult completed by:   SARABJIT Petersen, [facility]  Substance Use Disorder Evaluation Counselor  E-mail Address: [email or web address] Mental Health and Addiction [email or web address] Consult & Liaison Department  [facility], [address] [place] [address]

## 2024-01-10 NOTE — PLAN OF CARE
"  Problem: Adult Behavioral Health Plan of Care  Goal: Patient-Specific Goal (Individualization)  Description: Pt. Will follow recommendations of treatment team during hospital stay.   Pt. Will maintain ADLs without prompting during hospital stay.   Pt. Will attend > 50% of unit programing during hospital stay.   Pt. Will sleep 6-8 hours a night during hospital stay.  Pt. Will be free from self harm during hospital stay.  Pt. Will verbalize 3 coping skills prior to discharge.     1/9/2024-may wean if behavior with staff and peers is appropriate. Must comply with contraband checks upon return to  ICU.   Treatment team to discuss daily.      Outcome: Progressing  Note: Patient is asleep as start of shift.    Patient weaned to make a phone call after dinner.  She became upset as she was unable to reach [facility] or her boyfriend.  She was tapping her head on the wall in the hallway.  \"I haven't talked to my boyfriend since I've been here.\"  Patient signed 12 hour intent to leave.  She wanted to discharge to \"[facility].  At least there I can see my boyfriend. Being here isn't good for me.  I can't be here anymore.\"  Encouraged patient to speak with provider in the morning.  Patient would not have a ride to [place], nor would she have a place to stay tonight.  She agreed to stay and took back 12 hour intent to leave.   Patient was polite and cooperative the remainder of the shift.  Does need occasionally need boundaries reinforced,but overall maintains boundaries. Patient weaned most of the evening.  She attended groups.  Social with peers.    Problem: Suicide Risk  Goal: Absence of Self-Harm  Outcome: Progressing  Note: Patient had no noted self harm this shift.         "

## 2024-01-10 NOTE — PROGRESS NOTES
Pt had her CD assessment today 1/10/24.     Pt requested county and [facility] numbers, Pt states she would like to go to a group home because she wants to stay in [facility].

## 2024-01-11 PROCEDURE — 99231 SBSQ HOSP IP/OBS SF/LOW 25: CPT | Performed by: NURSE PRACTITIONER

## 2024-01-11 PROCEDURE — 99232 SBSQ HOSP IP/OBS MODERATE 35: CPT | Performed by: NURSE PRACTITIONER

## 2024-01-11 PROCEDURE — 124N000001 HC R&B MH

## 2024-01-11 PROCEDURE — 250N000013 HC RX MED GY IP 250 OP 250 PS 637: Performed by: NURSE PRACTITIONER

## 2024-01-11 RX ORDER — LORATADINE 10 MG/1
10 TABLET ORAL DAILY
Status: DISCONTINUED | OUTPATIENT
Start: 2024-01-11 | End: 2024-01-12 | Stop reason: HOSPADM

## 2024-01-11 RX ADMIN — NICOTINE 1 PATCH: 21 PATCH, EXTENDED RELEASE TRANSDERMAL at 09:44

## 2024-01-11 RX ADMIN — NICOTINE POLACRILEX 4 MG: 4 GUM, CHEWING BUCCAL at 21:21

## 2024-01-11 RX ADMIN — METRONIDAZOLE 500 MG: 500 TABLET ORAL at 21:19

## 2024-01-11 RX ADMIN — NICOTINE POLACRILEX 4 MG: 4 GUM, CHEWING BUCCAL at 13:33

## 2024-01-11 RX ADMIN — CHLORPROMAZINE HYDROCHLORIDE 50 MG: 25 TABLET, FILM COATED ORAL at 05:16

## 2024-01-11 RX ADMIN — METRONIDAZOLE 500 MG: 500 TABLET ORAL at 09:44

## 2024-01-11 RX ADMIN — NICOTINE POLACRILEX 4 MG: 4 GUM, CHEWING BUCCAL at 17:49

## 2024-01-11 RX ADMIN — NICOTINE POLACRILEX 4 MG: 4 GUM, CHEWING BUCCAL at 09:45

## 2024-01-11 RX ADMIN — CHLORPROMAZINE HYDROCHLORIDE 50 MG: 25 TABLET, FILM COATED ORAL at 09:43

## 2024-01-11 RX ADMIN — CHLORPROMAZINE HYDROCHLORIDE 50 MG: 25 TABLET, FILM COATED ORAL at 21:19

## 2024-01-11 RX ADMIN — NICOTINE POLACRILEX 4 MG: 4 GUM, CHEWING BUCCAL at 21:25

## 2024-01-11 RX ADMIN — NICOTINE POLACRILEX 4 MG: 4 GUM, CHEWING BUCCAL at 16:10

## 2024-01-11 RX ADMIN — LORATADINE 10 MG: 10 TABLET ORAL at 16:06

## 2024-01-11 ASSESSMENT — ACTIVITIES OF DAILY LIVING (ADL)
HYGIENE/GROOMING: INDEPENDENT
ORAL_HYGIENE: INDEPENDENT
ADLS_ACUITY_SCORE: 28
DRESS: INDEPENDENT
ADLS_ACUITY_SCORE: 28
HYGIENE/GROOMING: INDEPENDENT
ORAL_HYGIENE: INDEPENDENT
ADLS_ACUITY_SCORE: 28
DRESS: SCRUBS (BEHAVIORAL HEALTH)
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
## 2024-01-11 NOTE — PLAN OF CARE
Problem: Adult Behavioral Health Plan of Care  Goal: Patient-Specific Goal (Individualization)  Description: Pt. Will follow recommendations of treatment team during hospital stay.   Pt. Will maintain ADLs without prompting during hospital stay.   Pt. Will attend > 50% of unit programing during hospital stay.   Pt. Will sleep 6-8 hours a night during hospital stay.  Pt. Will be free from self harm during hospital stay.  Pt. Will verbalize 3 coping skills prior to discharge.     1/10/2024-may wean if behavior with staff and peers is appropriate. Must comply with contraband checks upon return to  ICU.   Treatment team to discuss daily.      Outcome: Progressing  Note: Report received from Noelle ANDERSON. Rounding complete.  Patient observed in bed and asleep at start of shift.    Patient is polite during interactions.  She weaned most of this shift.  Needed minimal reminders to maintain boundaries this shift.  Cooperative with medications.  She is hoping to get into some sort of inpatient residential treatment in the [facility], though is willing to do outpatient and look for housing.       Problem: Suicide Risk  Goal: Absence of Self-Harm  Outcome: Progressing  Note: Patient had no noted self harm this shift.     Goal Outcome Evaluation:

## 2024-01-11 NOTE — PROGRESS NOTES
"Spoke with Pt today, Pt does not want to go to a homeless shelter because it is too far away from her boyfriend and she states \"I have to be close to my boyfriend\"     Coordinated Intake done with [facility], an 1.5 hour long interview on January 23rd @ 10:30am.   "

## 2024-01-11 NOTE — PLAN OF CARE
"  Problem: Adult Behavioral Health Plan of Care  Goal: Patient-Specific Goal (Individualization)  Description: Pt. Will follow recommendations of treatment team during hospital stay.   Pt. Will maintain ADLs without prompting during hospital stay.   Pt. Will attend > 50% of unit programing during hospital stay.   Pt. Will sleep 6-8 hours a night during hospital stay.  Pt. Will be free from self harm during hospital stay.  Pt. Will verbalize 3 coping skills prior to discharge.     1/10/2024-may wean if behavior with staff and peers is appropriate. Must comply with contraband checks upon return to  ICU.   Treatment team to discuss daily.      Outcome: Progressing    Alert, VSS, denies pain this AM.   Sleeps much of shift-wakes irritable.   Complaints this afternoon of ear-ache-Medical NP will assess.   Pt verbalizes to this writer 'I have to be on an hour and half phone call on the 23rd. I'll just bang my head on the wall.\" ([facility])  Denies all criteria including: SI, SIB, HI or hallucinations.       Problem: Psychotic Symptoms  Goal: Psychotic Symptoms  Description: Signs and symptoms of listed problems will be absent or manageable.  Outcome: Progressing     Problem: Suicide Risk  Goal: Absence of Self-Harm  Outcome: Progressing   Goal Outcome Evaluation:    Plan of Care Reviewed With: patient        Face to face end of shift report communicated to oncoming.     Noelle Schmid RN  1/11/2024  1:59 PM                 "

## 2024-01-11 NOTE — PLAN OF CARE
Face to face shift report received from Noelle FERREIRA RN. Rounding completed, pt observed.    Problem: Adult Behavioral Health Plan of Care  Goal: Patient-Specific Goal (Individualization)  Description: Pt. Will follow recommendations of treatment team during hospital stay.   Pt. Will maintain ADLs without prompting during hospital stay.   Pt. Will attend > 50% of unit programing during hospital stay.   Pt. Will sleep 6-8 hours a night during hospital stay.  Pt. Will be free from self harm during hospital stay.  Pt. Will verbalize 3 coping skills prior to discharge.     1/11/2024-may wean if behavior with staff and peers is appropriate. Must comply with contraband checks upon return to  ICU.   Treatment team to discuss daily.      Outcome: Progressing  Note: Shift Summary: Patient was observed up in open unit lounge at the start of this shift.  Cooperative and attending groups.  Mood is calm.  Childlike at times but accepting of redirection when needed. Denies suicidal ideation and self injurious thoughts/urges.  Informs nurse that she may go to the [place] shelter from here until she can work with [facility] 1/23/24 to assist in housing.  Gait is balanced and steady.  Interactions with peers have been observed as appropriate. Denies hallucinations. Steady on feet.     Problem: Psychotic Symptoms  Goal: Psychotic Symptoms  Description: Patient will have a linear conversation by discharge.  Outcome: Progressing     Problem: Suicide Risk  Goal: Absence of Self-Harm  Description: Patient will be free of suicidal ideation or intent by discharge.  Outcome: Progressing  Face to face report will be communicated to oncoming RN.    Oly Alfaro RN  1/11/2024
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
## 2024-01-11 NOTE — PLAN OF CARE
Problem: Adult Behavioral Health Plan of Care  Goal: Patient-Specific Goal (Individualization)  Description: Pt. Will follow recommendations of treatment team during hospital stay.   Pt. Will maintain ADLs without prompting during hospital stay.   Pt. Will attend > 50% of unit programing during hospital stay.   Pt. Will sleep 6-8 hours a night during hospital stay.  Pt. Will be free from self harm during hospital stay.  Pt. Will verbalize 3 coping skills prior to discharge.     1/10/2024-may wean if behavior with staff and peers is appropriate. Must comply with contraband checks upon return to  ICU.   Treatment team to discuss daily.    Outcome: Progressing     Problem: Psychotic Symptoms  Goal: Psychotic Symptoms  Description: Signs and symptoms of listed problems will be absent or manageable.  Outcome: Progressing     Problem: Suicide Risk  Goal: Absence of Self-Harm  Outcome: Progressing     Face to face shift report received from Violet. Bell completed, patient requested writer help take her nicotine patch off.    Patient appeared to be sleeping for approximately 5.25 hours since 2345.    Patient had no reported or observed suicidal behavior or self harm this shift.      0516 - Patient requested & received PRN thorazine 50mg PO.    Face to face report will be communicated to oncoming RN.    Noelle Dodson RN  1/11/2024  6:25 AM

## 2024-01-12 VITALS
HEART RATE: 83 BPM | SYSTOLIC BLOOD PRESSURE: 105 MMHG | OXYGEN SATURATION: 95 % | RESPIRATION RATE: 18 BRPM | TEMPERATURE: 97.8 F | DIASTOLIC BLOOD PRESSURE: 56 MMHG

## 2024-01-12 PROCEDURE — 250N000013 HC RX MED GY IP 250 OP 250 PS 637: Performed by: NURSE PRACTITIONER

## 2024-01-12 PROCEDURE — 99239 HOSP IP/OBS DSCHRG MGMT >30: CPT | Performed by: NURSE PRACTITIONER

## 2024-01-12 RX ORDER — CHLORPROMAZINE HYDROCHLORIDE 50 MG/1
TABLET, FILM COATED ORAL
Qty: 90 TABLET | Refills: 1 | Status: SHIPPED | OUTPATIENT
Start: 2024-01-12

## 2024-01-12 RX ORDER — METRONIDAZOLE 500 MG/1
500 TABLET ORAL 2 TIMES DAILY
Qty: 8 TABLET | Refills: 0 | Status: SHIPPED | OUTPATIENT
Start: 2024-01-12 | End: 2024-01-16

## 2024-01-12 RX ORDER — LORATADINE 10 MG/1
10 TABLET ORAL DAILY
COMMUNITY
Start: 2024-01-13

## 2024-01-12 RX ORDER — QUETIAPINE FUMARATE 50 MG/1
50 TABLET, FILM COATED ORAL AT BEDTIME
Qty: 7 TABLET | Refills: 0 | Status: SHIPPED | OUTPATIENT
Start: 2024-01-12

## 2024-01-12 RX ADMIN — METRONIDAZOLE 500 MG: 500 TABLET ORAL at 08:53

## 2024-01-12 RX ADMIN — NICOTINE POLACRILEX 4 MG: 4 GUM, CHEWING BUCCAL at 10:44

## 2024-01-12 RX ADMIN — NICOTINE 1 PATCH: 21 PATCH, EXTENDED RELEASE TRANSDERMAL at 08:53

## 2024-01-12 RX ADMIN — NICOTINE POLACRILEX 4 MG: 4 GUM, CHEWING BUCCAL at 11:53

## 2024-01-12 RX ADMIN — LORATADINE 10 MG: 10 TABLET ORAL at 08:53

## 2024-01-12 RX ADMIN — CHLORPROMAZINE HYDROCHLORIDE 50 MG: 25 TABLET, FILM COATED ORAL at 08:53

## 2024-01-12 RX ADMIN — NICOTINE POLACRILEX 4 MG: 4 GUM, CHEWING BUCCAL at 13:14

## 2024-01-12 RX ADMIN — NICOTINE POLACRILEX 4 MG: 4 GUM, CHEWING BUCCAL at 08:54

## 2024-01-12 ASSESSMENT — ACTIVITIES OF DAILY LIVING (ADL)
ADLS_ACUITY_SCORE: 28
HYGIENE/GROOMING: INDEPENDENT

## 2024-01-12 NOTE — PROGRESS NOTES
Pt is discharging at the recommendation of the treatment team. Pt is discharging to [facility] transported by [employer]. Pt denies having any thoughts of hurting themself or anyone else. Pt denies anxiety or depression. Pt has resources listed in AVS for CD treatment, PCP and therapy resources. Discharge instructions, including; demographic sheet, psychiatric evaluation, discharge summary, and AVS were faxed to these next level of care providers.     [facility] will be here at 1:00pm to pick pt up for discharge to homeless shelter.

## 2024-01-12 NOTE — PLAN OF CARE
Problem: Adult Behavioral Health Plan of Care  Goal: Patient-Specific Goal (Individualization)  Description: Pt. Will follow recommendations of treatment team during hospital stay.   Pt. Will maintain ADLs without prompting during hospital stay.   Pt. Will attend > 50% of unit programing during hospital stay.   Pt. Will sleep 6-8 hours a night during hospital stay.  Pt. Will be free from self harm during hospital stay.  Pt. Will verbalize 3 coping skills prior to discharge.     1/11/2024-may wean if behavior with staff and peers is appropriate. Must comply with contraband checks upon return to  ICU.   Treatment team to discuss daily.      Outcome: Progressing     Problem: Suicide Risk  Goal: Absence of Self-Harm  Description: Patient will be free of suicidal ideation or intent by discharge.  Outcome: Progressing     Face to face shift report received from Oly ANDERSON. Rounding completed, pt observed.     Pt appeared to sleep most of this shift. Pt did not have any noted episodes of self harm this shift.    Face to face report will be communicated to oncoming RN.    Rae Merrill RN  1/12/2024  6:22 AM

## 2024-01-12 NOTE — PLAN OF CARE
Discharge Note    Patient Discharged to [facility] in [place] on 1/12/2024 1:58 PM via Taxi accompanied by unit assistant walks to awaiting taxi.     Patient informed of discharge instructions in AVS. patient verbalizes understanding and denies having any questions pertaining to AVS. Patient stable at time of discharge. Patient denies SI, HI, and thoughts of self harm at time of discharge. All personal belongings returned to patient. Discharge prescriptions sent to [facility]  via electronic communication. Medications picked up and given to pt. Discharge instructions discussed in depth. Pt verbalizes understanding.  Noelle Schmid RN  1/12/2024  1:58 PM    Problem: Adult Behavioral Health Plan of Care  Goal: Plan of Care Review  Outcome: Met  Flowsheets (Taken 1/12/2024 0900)  Patient Agreement with Plan of Care: agrees  Goal: Patient-Specific Goal (Individualization)  Description: Pt. Will follow recommendations of treatment team during hospital stay.   Pt. Will maintain ADLs without prompting during hospital stay.   Pt. Will attend > 50% of unit programing during hospital stay.   Pt. Will sleep 6-8 hours a night during hospital stay.  Pt. Will be free from self harm during hospital stay.  Pt. Will verbalize 3 coping skills prior to discharge.     1/11/2024-may wean if behavior with staff and peers is appropriate. Must comply with contraband checks upon return to  ICU.   Treatment team to discuss daily.      Outcome: Met    A&O, VSS, denies pain. Full range affect--animated with rambling, loud speech. Pt wanting to discharge. States she is not having suicidal thoughts and is feeling good. Pt talks with [facility] in [place] who have a female bed open. Calls dad to give him update. Medications given to pt. Discharge instructions discussed in depth including medications., verbalizes understanding.   Denies all criteria including: SI, SIB, HI or hallucinations.    Goal: Adheres to Safety  Considerations for Self and Others  Outcome: Met  Intervention: Develop and Maintain Individualized Safety Plan  Recent Flowsheet Documentation  Taken 1/12/2024 0900 by Noelle Schmid RN  Safety Measures:   environmental rounds completed   safety rounds completed   suicide assessment completed  Goal: Absence of New-Onset Illness or Injury  Outcome: Met  Intervention: Identify and Manage Fall Risk  Recent Flowsheet Documentation  Taken 1/12/2024 0900 by Noelle Schmid RN  Safety Measures:   environmental rounds completed   safety rounds completed   suicide assessment completed  Goal: Optimized Coping Skills in Response to Life Stressors  Outcome: Met  Goal: Develops/Participates in Therapeutic [facility] [place] [facility] to Support Successful Transition  Outcome: Met  Intervention: Foster Therapeutic [place] [facility]  Recent Flowsheet Documentation  Taken 1/12/2024 0900 by Noelle Schmid RN  Trust Relationship/Rapport:   care explained   choices provided   thoughts/feelings acknowledged   reassurance provided   questions answered     Problem: Psychotic Symptoms  Goal: Psychotic Symptoms  Description: Patient will have a linear conversation by discharge.  Outcome: Met  Goal: Social and Therapeutic (Psychotic Symptoms)  Description: Signs and symptoms of listed problems will be absent or manageable.  Outcome: Met     Problem: Suicide Risk  Goal: Absence of Self-Harm  Description: Patient will be free of suicidal ideation or intent by discharge.  Outcome: Met     Problem: Suicidal Behavior  Goal: Suicidal Behavior is Absent or Managed  Outcome: Met   Goal Outcome Evaluation:    Plan of Care Reviewed With: patient

## 2024-01-12 NOTE — DISCHARGE SUMMARY
"[facility] PSYCHIATRY  DISCHARGE SUMMARY     DISCHARGE DATA     Mansoor Skinner MRN# [id number]   Age: 18 year old YOB: 2005     Date of Admission:  1/8/2024  Date of Discharge:  January 12, 2024  Discharge Provider:  Veronica Galvan NP       REASON FOR ADMISSION        She was brought to the emergency room by crisis response team for suicidal ideation.  She had been staying at the [facility] homeless shelter.     She recently moved to [place] from [place] with her father early December 2023.  She and her father were kicked out of their friends home that they were staying with and they have also both been kicked out of the [facility] due to alcohol use.  She told the emergency room she has been dealing with homelessness since the age of 15 which clearly causes an increase in her depression.  It sounds as though her father return to [place] 1 week ago though she decided to stay here with her boyfriend.  She does not have an active suicidal plan.  Denies any homicidal ideations denies hallucinations.  In the past she had been on Zoloft though she has not been on it for over 1 year she has also been on Thorazine in the past though unclear who prescribed this to her and for what.     I was told that when she arrived to the nursing unit and upon her arrival was quite dramatic and making comments to staff \"I am going to fuck shit up, I fight if I have to.I punch shit.  She was quite irritable upon admission though she is not on a 72-hour hold and is voluntarily here.     I met with her the following morning and upon my meeting with her she had already been woke up twice and made a comment about how \"people keep waking me up\".  She had already told nursing staff she wanted to leave and I told her that I was the person who she would need to speak with if she wanted to leave.  She then started stating \"I need to be getting into housing.  I been homeless for 3 years and I cannot do it " "anymore it makes him more depressed and I need to get back on my Thorazine\".  She is quite animated dramatic and loud.  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  She is quite concrete in her thought process.  Her speech is loud and a bit pressured though it is difficult to tell if this is secondary to her mood versus personality.  She tells me she is having suicidal thoughts and has been all of the time though states \"they only get really bad when I start drinking and I need to stop drinking\".  I asked her if she would like to go to chemical dependency treatment and initially she stated yes the later on told the  she wanted to go to treatment.  She told me that she needed to find an apartment or house of her own from here.  I told her it would be very difficult to find her a place of living from here quickly.  She tells me she has only been here 1 month though previous notes states she has been here 1 year.  She tells me her father went back to [place].  She states she and her father are very close and she misses him though it stayed back in [place] because she met a boy she has been dating for the last 3 weeks.  This man is in some form of residential treatment in [place].  She states \"I need to stick around because he said he is going to be spending $30,000 on me\".  It sounds as though he may be getting a lump sum of money and has told her he is going to spend at all on her.  She states he is a level of her life.  They have been dating 3 weeks.       DISCHARGE DIAGNOSIS   1Adjustment disorder with mixed features  2.  Unspecified cognitive disorder: Rule out intellectual disability versus borderline intellectual functioning  3.  Alcohol use disorder, moderate to severe          CONSULTS     None needed while here       HOSPITAL COURSE     Legal status: None    Patient was admitted to unit 5 due " to the aforementioned presentation. The patient was placed under 15 minute checks to ensure patient safety. The patient participated in unit programming and groups as able.      Prior to admission Mansoor Skinner was taking no medications. The following medication changes were made  during hospitalization:     Mansoor stated she has been on multiple medications over the years.  She had been to multiple residential programs as a child and was raised in foster care.  She had significant behavioral dysregulation though it is unsure to what extent though I suspect it is highly related to her intellectual abilities and likely has a low IQ though we have no records to support this.  She has only been in [place] for 1 month and her father is now back in [place].  While she was here she requested to restart Thorazine as she had been on in the past at 100 mg and states it is the only medication that helped her agitation and insomnia.  She was restarted on Thorazine and tolerated it well.  She had no further suicidal ideation through her stay.  She denied any hallucinations during her stay though states she has had them in the past but was very vague about this.  She did not appear to be manic though was pressured more in a childlike way and exaggerated in a childlike way.  She no longer feels the need to be hospitalized and was thankful to be restarted on her medications.  Her goal is to return to [facility] though she is no longer allowed in their homeless shelter.  She was discharged to the [facility] shelter and has an upcoming appointment for [facility] services to help with housing    With these changes and supports the patient noticed improvement in their symptoms and felt sufficiently ready for discharge. As a result, Mansoor Skinner was discharged. At the time of discharge, Mansoor Skinner was determined to not be a danger to self or others. At the current time of discharge, the patient does not  meet criteria for involuntary hospitalization. On the day of discharge, the patient reports that they do not have suicidal or homicidal ideation. Steps taken to minimize risk include: assessing patient s behavior and thought process daily during hospital stay, discharging patient with adequate plan for follow up for mental and physical health and discussing safety plan of returning to the hospital should the patient ever have thoughts of harming themselves or others. Therefore, based on all available evidence including the factors cited above, the patient does not appear to be at imminent risk for self-harm, and is appropriate for outpatient level of care. However, if patient uses substances or is medication non-adherent, their risk of decompensation and SI will be elevated. This was discussed with the patient.       DISCHARGE MEDICATIONS     Current Discharge Medication List        START taking these medications    Details   chlorproMAZINE (THORAZINE) 50 MG tablet Take one twice daily and one daily prn for anxiety/agitation/insomnia/hallucinations  Qty: 90 tablet, Refills: 1    Comments: Please have our unit cover meds  Associated Diagnoses: Major depressive disorder, recurrent episode, moderate (H)      loratadine (CLARITIN) 10 MG tablet Take 1 tablet (10 mg) by mouth daily    Associated Diagnoses: Major depressive disorder, recurrent episode, moderate (H)      metroNIDAZOLE (FLAGYL) 500 MG tablet Take 1 tablet (500 mg) by mouth 2 times daily for 4 days  Qty: 8 tablet, Refills: 0    Associated Diagnoses: Major depressive disorder, recurrent episode, moderate (H)           CONTINUE these medications which have NOT CHANGED    Details   albuterol (PROAIR HFA/PROVENTIL HFA/VENTOLIN HFA) 108 (90 Base) MCG/ACT inhaler Inhale 2 puffs into the lungs every 6 hours as needed for shortness of breath, wheezing or cough  Qty: 18 g, Refills: 0    Comments: Pharmacy may dispense brand covered by insurance (Proair, or proventil  or ventolin or generic albuterol inhaler). Community care.      ibuprofen (ADVIL/MOTRIN) 200 MG tablet Take 800 mg by mouth daily as needed for pain (fever)           STOP taking these medications       acetaminophen (TYLENOL) 325 MG tablet Comments:   Reason for Stopping:               Reason for two or more neuroleptics:    Short supply of Seroquel was given at discharge as our pharmacy will not have Thorazine available until Monday.       MENTAL STATUS EXAM     Vitals: /56   Pulse 83   Temp 97.8  F (36.6  C) (Temporal)   Resp 18   LMP 12/03/2023 (Approximate)   SpO2 95%     MSE/PSYCH  PSYCHIATRIC EXAM  /56   Pulse 83   Temp 97.8  F (36.6  C) (Temporal)   Resp 18   LMP 12/03/2023 (Approximate)   SpO2 95%   -Appearance/Behavior: normal and improved  -Motor: intact  -Gait: intact  -Abnormal involuntary movements: none  -Mood: reactive and calm  -Affect: brighter  -Speech: regular though somewhat pressured and childlike  -Thought process/associations: Logical and Goal directed.  -Thought content: no delusions or hallucinations  -Perceptual disturbances: No hallucinations..              -Suicidal/Homicidal Ideation: denies any   -Judgment: Limited at baseline likely due to intellectual abilities  -Insight: Limited due to intellectual abilities  *Orientation: time, place and person.  *Memory: intact  *Attention: fair  *Language: fluent, no aphasias, able to repeat phrases and name objects. Vocab intact.  *Fund of information: below average suspect low IQ  *Cognitive functioning estimate: 0 - independent.       DISCHARGE PLAN     1.  Education given regarding diagnostic and treatment options with risks, benefits and alternatives with adequate verbalization of understanding.  2.  Discharge to [facility] homeless shelter. Upon detailed review of risk factors, patient amenable for release.   3.  Continue aforementioned medications and associated medication changes with follow-up by outpatient  provider.  4.  Crisis management planning in place.    5.  Nursing and  to review further discharge recommendations.   6.  Patient is being discharged to home with the following appointments as detailed below.           DISCHARGE SERVICES PROVIDED     40 minutes spent on discharge services, including:  Final examination of patient.  Review and discussion of hospital stay.  Instructions for continued outpatient care/goals.  Preparation of discharge records.  Preparation of medications refills and new prescriptions.  Preparation of applicable referral forms.        ATTESTATION     Veronica Galvan NP    This note was created with help of Dragon dictation system. Grammatical / typing errors are not intentional.

## 2024-01-15 ENCOUNTER — TELEPHONE (OUTPATIENT)
Dept: BEHAVIORAL HEALTH | Facility: HOSPITAL | Age: 19
End: 2024-01-15

## 2024-01-15 NOTE — TELEPHONE ENCOUNTER
"[facility]: Post-Hospital Discharge Note     Situation   Post hospital discharge call placed 01/15/24.      Mansoor did not answer. A message was not left as the voicemail box was not set up.  Messages are left with a call back number should they need to reach staff with any questions, need for additional resources, or need assistance setting up a post hospitalization follow up appointments, if unable to do so independently.    Inpatient Mental Health Admission Information:  Admission Date: 1/8/24  Admission Reason: She was brought to the emergency room by crisis response team for suicidal ideation.  She had been staying at the [facility] homeless shelter.     She recently moved to [place] from [place] with her father early December 2023.  She and her father were kicked out of their friends home that they were staying with and they have also both been kicked out of the [facility] due to alcohol use.  She told the emergency room she has been dealing with homelessness since the age of 15 which clearly causes an increase in her depression.  It sounds as though her father return to [place] 1 week ago though she decided to stay here with her boyfriend.  She does not have an active suicidal plan.  Denies any homicidal ideations denies hallucinations.  In the past she had been on Zoloft though she has not been on it for over 1 year she has also been on Thorazine in the past though unclear who prescribed this to her and for what.     I was told that when she arrived to the nursing unit and upon her arrival was quite dramatic and making comments to staff \"I am going to fuck shit up, I fight if I have to.I punch shit.  She was quite irritable upon admission though she is not on a 72-hour hold and is voluntarily here.     I met with her the following morning and upon my meeting with her she had already been woke up twice and made a comment about how \"people keep waking me up\".  She had already told nursing staff she " "wanted to leave and I told her that I was the person who she would need to speak with if she wanted to leave.  She then started stating \"I need to be getting into housing.  I been homeless for 3 years and I cannot do it anymore it makes him more depressed and I need to get back on my Thorazine\".  She is quite animated dramatic and loud.  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  She is quite concrete in her thought process.  Her speech is loud and a bit pressured though it is difficult to tell if this is secondary to her mood versus personality.  She tells me she is having suicidal thoughts and has been all of the time though states \"they only get really bad when I start drinking and I need to stop drinking\".  I asked her if she would like to go to chemical dependency treatment and initially she stated yes the later on told the  she wanted to go to treatment.  She told me that she needed to find an apartment or house of her own from here.  I told her it would be very difficult to find her a place of living from here quickly.  She tells me she has only been here 1 month though previous notes states she has been here 1 year.  She tells me her father went back to [place].  She states she and her father are very close and she misses him though it stayed back in [place] because she met a boy she has been dating for the last 3 weeks.  This man is in some form of residential treatment in [place].  She states \"I need to stick around because he said he is going to be spending $30,000 on me\".  It sounds as though he may be getting a lump sum of money and has told her he is going to spend at all on her.  She states he is a level of her life.  They have been dating 3 weeks.    Inpatient Mental Health Discharge Information:  Discharge Date: 1/12/24  Discharged to: Shelter  Discharge Diagnosis: Adjustment " "disorder with mixed features  2.  Unspecified cognitive disorder: Rule out intellectual disability versus borderline intellectual functioning  3.  Alcohol use disorder, moderate to severe    Background    The following information is obtained from the hospital after visit summary and inpatient provider notes.     \"Legal status: None     Patient was admitted to unit 5 due to the aforementioned presentation. The patient was placed under 15 minute checks to ensure patient safety. The patient participated in unit programming and groups as able.        Prior to admission Mansoor Skinner was taking no medications. The following medication changes were made  during hospitalization:      Mansoor stated she has been on multiple medications over the years.  She had been to multiple residential programs as a child and was raised in foster care.  She had significant behavioral dysregulation though it is unsure to what extent though I suspect it is highly related to her intellectual abilities and likely has a low IQ though we have no records to support this.  She has only been in [place] for 1 month and her father is now back in [place].  While she was here she requested to restart Thorazine as she had been on in the past at 100 mg and states it is the only medication that helped her agitation and insomnia.  She was restarted on Thorazine and tolerated it well.  She had no further suicidal ideation through her stay.  She denied any hallucinations during her stay though states she has had them in the past but was very vague about this.  She did not appear to be manic though was pressured more in a childlike way and exaggerated in a childlike way.  She no longer feels the need to be hospitalized and was thankful to be restarted on her medications.  Her goal is to return to [facility] though she is no longer allowed in their homeless shelter.  She was discharged to the [facility] shelter and has an upcoming " "appointment for [facility] to help with housing     With these changes and supports the patient noticed improvement in their symptoms and felt sufficiently ready for discharge. As a result, Mansoor Skinner was discharged. At the time of discharge, Mansoor Skinner was determined to not be a danger to self or others. At the current time of discharge, the patient does not meet criteria for involuntary hospitalization. On the day of discharge, the patient reports that they do not have suicidal or homicidal ideation. Steps taken to minimize risk include: assessing patient s behavior and thought process daily during hospital stay, discharging patient with adequate plan for follow up for mental and physical health and discussing safety plan of returning to the hospital should the patient ever have thoughts of harming themselves or others. Therefore, based on all available evidence including the factors cited above, the patient does not appear to be at imminent risk for self-harm, and is appropriate for outpatient level of care. However, if patient uses substances or is medication non-adherent, their risk of decompensation and SI will be elevated. This was discussed with the patient.\"        Post Discharge Assessment   How have your symptoms been since being discharged from the hospital? Unable to reach patient  Do you have your discharge instructions/after visit summary? NA  Do you have any questions related to your discharge instructions? NA    Discharge Medication Assessment   Medications were reviewed in full on discharge, including: Medications to be started, medications to be stopped, medications to be continued from preadmission and any side effects.      Prescriptions were e-scribed or sent to their preferred pharmacy at discharge: Yes  Do you have any questions about your medications? NA    Outpatient Plan/Future Appointments  Discharge follow up appointment scheduled within 14 days of discharging " from hospital? No: she will be seeing [facility] to assist with getting set up with housing and services.   Health Care Follow-up:      [facility]  [address],   [place], [place] [address] [phone]  Interview on January 23rd @ 10:30am     [facility]  [address] [place] [address]  [phone]      Further information, barriers, or follow up this writer has addressed: N/A

## (undated) RX ORDER — DIPHENHYDRAMINE HCL 25 MG
CAPSULE ORAL
Status: DISPENSED
Start: 2024-01-08